# Patient Record
Sex: FEMALE | Race: WHITE | NOT HISPANIC OR LATINO | Employment: OTHER | URBAN - METROPOLITAN AREA
[De-identification: names, ages, dates, MRNs, and addresses within clinical notes are randomized per-mention and may not be internally consistent; named-entity substitution may affect disease eponyms.]

---

## 2017-01-20 ENCOUNTER — HISTORICAL (OUTPATIENT)
Dept: INFUSION THERAPY | Facility: HOSPITAL | Age: 57
End: 2017-01-20

## 2017-02-10 ENCOUNTER — HISTORICAL (OUTPATIENT)
Dept: INFUSION THERAPY | Facility: HOSPITAL | Age: 57
End: 2017-02-10

## 2017-02-17 ENCOUNTER — HISTORICAL (OUTPATIENT)
Dept: INFUSION THERAPY | Facility: HOSPITAL | Age: 57
End: 2017-02-17

## 2017-03-03 ENCOUNTER — HISTORICAL (OUTPATIENT)
Dept: INFUSION THERAPY | Facility: HOSPITAL | Age: 57
End: 2017-03-03

## 2017-03-24 ENCOUNTER — HISTORICAL (OUTPATIENT)
Dept: INFUSION THERAPY | Facility: HOSPITAL | Age: 57
End: 2017-03-24

## 2017-04-13 ENCOUNTER — HISTORICAL (OUTPATIENT)
Dept: INFUSION THERAPY | Facility: HOSPITAL | Age: 57
End: 2017-04-13

## 2017-05-05 ENCOUNTER — HISTORICAL (OUTPATIENT)
Dept: INFUSION THERAPY | Facility: HOSPITAL | Age: 57
End: 2017-05-05

## 2017-05-26 ENCOUNTER — HISTORICAL (OUTPATIENT)
Dept: INFUSION THERAPY | Facility: HOSPITAL | Age: 57
End: 2017-05-26

## 2017-05-26 LAB
ABS NEUT (OLG): 5.01 X10(3)/MCL (ref 2.1–9.2)
ANION GAP SERPL CALC-SCNC: 19 MMOL/L
BASOPHILS # BLD AUTO: 0 X10(3)/MCL (ref 0–0.2)
BASOPHILS NFR BLD AUTO: 0.3 %
BUN SERPL-MCNC: 14 MG/DL (ref 7–18)
CHLORIDE SERPL-SCNC: 99 MMOL/L (ref 98–109)
CREAT SERPL-MCNC: 0.9 MG/DL (ref 0.6–1.3)
EOSINOPHIL # BLD AUTO: 0.3 X10(3)/MCL (ref 0–0.9)
EOSINOPHIL NFR BLD AUTO: 4.7 %
ERYTHROCYTE [DISTWIDTH] IN BLOOD BY AUTOMATED COUNT: 14 % (ref 11.5–17)
GLUCOSE SERPL-MCNC: 193 MG/DL (ref 70–105)
HCT VFR BLD AUTO: 36.7 % (ref 37–47)
HCT VFR BLD CALC: 37 % (ref 38–51)
HGB BLD-MCNC: 11.6 GM/DL (ref 12–16)
HGB BLD-MCNC: 12.6 MG/DL (ref 12–17)
LYMPHOCYTES # BLD AUTO: 1 X10(3)/MCL (ref 0.6–4.6)
LYMPHOCYTES NFR BLD AUTO: 15.3 %
MCH RBC QN AUTO: 29.9 PG (ref 27–31)
MCHC RBC AUTO-ENTMCNC: 31.6 GM/DL (ref 33–36)
MCV RBC AUTO: 94.6 FL (ref 80–94)
MONOCYTES # BLD AUTO: 0.5 X10(3)/MCL (ref 0.1–1.3)
MONOCYTES NFR BLD AUTO: 6.8 %
NEUTROPHILS # BLD AUTO: 5 X10(3)/MCL (ref 2.1–9.2)
NEUTROPHILS NFR BLD AUTO: 72.9 %
PLATELET # BLD AUTO: 125 X10(3)/MCL (ref 130–400)
PMV BLD AUTO: 9.2 FL (ref 9.4–12.4)
POC IONIZED CALCIUM: 1.09 MMOL/L (ref 1.12–1.32)
POC TCO2: 28 MMOL/L (ref 22–27)
POTASSIUM BLD-SCNC: 4.7 MMOL/L (ref 3.5–4.9)
RBC # BLD AUTO: 3.88 X10(6)/MCL (ref 4.2–5.4)
SODIUM BLD-SCNC: 141 MMOL/L (ref 138–146)
WBC # SPEC AUTO: 6.9 X10(3)/MCL (ref 4.5–11.5)

## 2017-06-16 ENCOUNTER — HISTORICAL (OUTPATIENT)
Dept: INFUSION THERAPY | Facility: HOSPITAL | Age: 57
End: 2017-06-16

## 2017-06-16 LAB
ABS NEUT (OLG): 4.61 X10(3)/MCL (ref 2.1–9.2)
ANION GAP SERPL CALC-SCNC: 21 MMOL/L
BASOPHILS # BLD AUTO: 0 X10(3)/MCL (ref 0–0.2)
BASOPHILS NFR BLD AUTO: 0.4 %
BUN SERPL-MCNC: 17 MG/DL (ref 7–18)
CHLORIDE SERPL-SCNC: 96 MMOL/L (ref 98–109)
CREAT SERPL-MCNC: 1 MG/DL (ref 0.6–1.3)
EOSINOPHIL # BLD AUTO: 0.4 X10(3)/MCL (ref 0–0.9)
EOSINOPHIL NFR BLD AUTO: 6.3 %
ERYTHROCYTE [DISTWIDTH] IN BLOOD BY AUTOMATED COUNT: 14 % (ref 11.5–17)
GLUCOSE SERPL-MCNC: 249 MG/DL (ref 70–105)
HCT VFR BLD AUTO: 36.8 % (ref 37–47)
HCT VFR BLD CALC: 37 % (ref 38–51)
HGB BLD-MCNC: 11.6 GM/DL (ref 12–16)
HGB BLD-MCNC: 12.6 MG/DL (ref 12–17)
LYMPHOCYTES # BLD AUTO: 1.3 X10(3)/MCL (ref 0.6–4.6)
LYMPHOCYTES NFR BLD AUTO: 19.2 %
MCH RBC QN AUTO: 29.7 PG (ref 27–31)
MCHC RBC AUTO-ENTMCNC: 31.5 GM/DL (ref 33–36)
MCV RBC AUTO: 94.4 FL (ref 80–94)
MONOCYTES # BLD AUTO: 0.6 X10(3)/MCL (ref 0.1–1.3)
MONOCYTES NFR BLD AUTO: 8.2 %
NEUTROPHILS # BLD AUTO: 4.6 X10(3)/MCL (ref 2.1–9.2)
NEUTROPHILS NFR BLD AUTO: 65.9 %
PLATELET # BLD AUTO: 154 X10(3)/MCL (ref 130–400)
PMV BLD AUTO: 8.9 FL (ref 9.4–12.4)
POC IONIZED CALCIUM: 1.14 MMOL/L (ref 1.12–1.32)
POC TCO2: 28 MMOL/L (ref 22–27)
POTASSIUM BLD-SCNC: 4.8 MMOL/L (ref 3.5–4.9)
RBC # BLD AUTO: 3.9 X10(6)/MCL (ref 4.2–5.4)
SODIUM BLD-SCNC: 138 MMOL/L (ref 138–146)
WBC # SPEC AUTO: 7 X10(3)/MCL (ref 4.5–11.5)

## 2017-07-07 ENCOUNTER — HISTORICAL (OUTPATIENT)
Dept: INFUSION THERAPY | Facility: HOSPITAL | Age: 57
End: 2017-07-07

## 2017-07-07 LAB
ABS NEUT (OLG): 4.24 X10(3)/MCL (ref 2.1–9.2)
ANION GAP SERPL CALC-SCNC: 18 MMOL/L
BASOPHILS # BLD AUTO: 0 X10(3)/MCL (ref 0–0.2)
BASOPHILS NFR BLD AUTO: 0.6 %
BUN SERPL-MCNC: 12 MG/DL (ref 7–18)
CHLORIDE SERPL-SCNC: 95 MMOL/L (ref 98–109)
CREAT SERPL-MCNC: 1 MG/DL (ref 0.6–1.3)
EOSINOPHIL # BLD AUTO: 0.4 X10(3)/MCL (ref 0–0.9)
EOSINOPHIL NFR BLD AUTO: 7.2 %
ERYTHROCYTE [DISTWIDTH] IN BLOOD BY AUTOMATED COUNT: 13.8 % (ref 11.5–17)
GLUCOSE SERPL-MCNC: 376 MG/DL (ref 70–105)
HCT VFR BLD AUTO: 39.3 % (ref 37–47)
HCT VFR BLD CALC: 40 % (ref 38–51)
HGB BLD-MCNC: 12.8 GM/DL (ref 12–16)
HGB BLD-MCNC: 13.6 MG/DL (ref 12–17)
LYMPHOCYTES # BLD AUTO: 1.1 X10(3)/MCL (ref 0.6–4.6)
LYMPHOCYTES NFR BLD AUTO: 17.4 %
MCH RBC QN AUTO: 30 PG (ref 27–31)
MCHC RBC AUTO-ENTMCNC: 32.6 GM/DL (ref 33–36)
MCV RBC AUTO: 92.3 FL (ref 80–94)
MONOCYTES # BLD AUTO: 0.4 X10(3)/MCL (ref 0.1–1.3)
MONOCYTES NFR BLD AUTO: 6.4 %
NEUTROPHILS # BLD AUTO: 4.2 X10(3)/MCL (ref 2.1–9.2)
NEUTROPHILS NFR BLD AUTO: 68.4 %
PLATELET # BLD AUTO: 141 X10(3)/MCL (ref 130–400)
PMV BLD AUTO: 9.8 FL (ref 9.4–12.4)
POC IONIZED CALCIUM: 1.08 MMOL/L (ref 1.12–1.32)
POC TCO2: 30 MMOL/L (ref 22–27)
POTASSIUM BLD-SCNC: 4.8 MMOL/L (ref 3.5–4.9)
RBC # BLD AUTO: 4.26 X10(6)/MCL (ref 4.2–5.4)
SODIUM BLD-SCNC: 136 MMOL/L (ref 138–146)
WBC # SPEC AUTO: 6.2 X10(3)/MCL (ref 4.5–11.5)

## 2017-07-28 ENCOUNTER — HISTORICAL (OUTPATIENT)
Dept: INFUSION THERAPY | Facility: HOSPITAL | Age: 57
End: 2017-07-28

## 2017-07-28 LAB
ABS NEUT (OLG): 4.12 X10(3)/MCL (ref 2.1–9.2)
ALBUMIN SERPL-MCNC: 4 GM/DL (ref 3.4–5)
ALBUMIN/GLOB SERPL: 1.2 {RATIO}
ALP SERPL-CCNC: 80 UNIT/L (ref 38–126)
ALT SERPL-CCNC: 35 UNIT/L (ref 12–78)
AST SERPL-CCNC: 28 UNIT/L (ref 15–37)
BASOPHILS # BLD AUTO: 0 X10(3)/MCL (ref 0–0.2)
BASOPHILS NFR BLD AUTO: 0.5 %
BILIRUB SERPL-MCNC: 0.6 MG/DL (ref 0.2–1)
BILIRUBIN DIRECT+TOT PNL SERPL-MCNC: 0.2 MG/DL (ref 0–0.2)
BILIRUBIN DIRECT+TOT PNL SERPL-MCNC: 0.4 MG/DL (ref 0–0.8)
BUN SERPL-MCNC: 14 MG/DL (ref 7–18)
CALCIUM SERPL-MCNC: 9 MG/DL (ref 8.5–10.1)
CEA SERPL-MCNC: 6 NG/ML (ref 0–3)
CHLORIDE SERPL-SCNC: 94 MMOL/L (ref 98–107)
CO2 SERPL-SCNC: 33 MMOL/L (ref 21–32)
CREAT SERPL-MCNC: 1.2 MG/DL (ref 0.55–1.02)
DEPRECATED CALCIDIOL+CALCIFEROL SERPL-MC: 38.75 NG/ML (ref 30–80)
EOSINOPHIL # BLD AUTO: 0.4 X10(3)/MCL (ref 0–0.9)
EOSINOPHIL NFR BLD AUTO: 6 %
ERYTHROCYTE [DISTWIDTH] IN BLOOD BY AUTOMATED COUNT: 14.1 % (ref 11.5–17)
GLOBULIN SER-MCNC: 3.3 GM/DL (ref 2.4–3.5)
GLUCOSE SERPL-MCNC: 401 MG/DL (ref 74–106)
HCT VFR BLD AUTO: 39.9 % (ref 37–47)
HGB BLD-MCNC: 12.9 GM/DL (ref 12–16)
LYMPHOCYTES # BLD AUTO: 1.2 X10(3)/MCL (ref 0.6–4.6)
LYMPHOCYTES NFR BLD AUTO: 18.8 %
MCH RBC QN AUTO: 29.8 PG (ref 27–31)
MCHC RBC AUTO-ENTMCNC: 32.3 GM/DL (ref 33–36)
MCV RBC AUTO: 92.1 FL (ref 80–94)
MONOCYTES # BLD AUTO: 0.5 X10(3)/MCL (ref 0.1–1.3)
MONOCYTES NFR BLD AUTO: 7.5 %
NEUTROPHILS # BLD AUTO: 4.1 X10(3)/MCL (ref 2.1–9.2)
NEUTROPHILS NFR BLD AUTO: 67.2 %
PLATELET # BLD AUTO: 123 X10(3)/MCL (ref 130–400)
PMV BLD AUTO: 9.6 FL (ref 9.4–12.4)
POTASSIUM SERPL-SCNC: 5.1 MMOL/L (ref 3.5–5.1)
PROT SERPL-MCNC: 7.3 GM/DL (ref 6.4–8.2)
RBC # BLD AUTO: 4.33 X10(6)/MCL (ref 4.2–5.4)
SODIUM SERPL-SCNC: 135 MMOL/L (ref 136–145)
WBC # SPEC AUTO: 6.1 X10(3)/MCL (ref 4.5–11.5)

## 2017-08-02 ENCOUNTER — HISTORICAL (OUTPATIENT)
Dept: HEMATOLOGY/ONCOLOGY | Facility: CLINIC | Age: 57
End: 2017-08-02

## 2017-08-02 LAB
ABS NEUT (OLG): 4.56 X10(3)/MCL (ref 2.1–9.2)
BASOPHILS # BLD AUTO: 0 X10(3)/MCL (ref 0–0.2)
BASOPHILS NFR BLD AUTO: 0.6 %
EOSINOPHIL # BLD AUTO: 0.4 X10(3)/MCL (ref 0–0.9)
EOSINOPHIL NFR BLD AUTO: 6.3 %
ERYTHROCYTE [DISTWIDTH] IN BLOOD BY AUTOMATED COUNT: 14 % (ref 11.5–17)
HCT VFR BLD AUTO: 39.5 % (ref 37–47)
HGB BLD-MCNC: 12.8 GM/DL (ref 12–16)
LYMPHOCYTES # BLD AUTO: 1.3 X10(3)/MCL (ref 0.6–4.6)
LYMPHOCYTES NFR BLD AUTO: 18.6 %
MCH RBC QN AUTO: 29.8 PG (ref 27–31)
MCHC RBC AUTO-ENTMCNC: 32.4 GM/DL (ref 33–36)
MCV RBC AUTO: 92.1 FL (ref 80–94)
MONOCYTES # BLD AUTO: 0.5 X10(3)/MCL (ref 0.1–1.3)
MONOCYTES NFR BLD AUTO: 7.2 %
NEUTROPHILS # BLD AUTO: 4.6 X10(3)/MCL (ref 2.1–9.2)
NEUTROPHILS NFR BLD AUTO: 67.3 %
PLATELET # BLD AUTO: 124 X10(3)/MCL (ref 130–400)
PMV BLD AUTO: 9.8 FL (ref 9.4–12.4)
RBC # BLD AUTO: 4.29 X10(6)/MCL (ref 4.2–5.4)
WBC # SPEC AUTO: 6.8 X10(3)/MCL (ref 4.5–11.5)

## 2017-09-22 ENCOUNTER — HISTORICAL (OUTPATIENT)
Dept: INFUSION THERAPY | Facility: HOSPITAL | Age: 57
End: 2017-09-22

## 2017-11-02 ENCOUNTER — HISTORICAL (OUTPATIENT)
Dept: HEMATOLOGY/ONCOLOGY | Facility: CLINIC | Age: 57
End: 2017-11-02

## 2017-11-02 LAB
ABS NEUT (OLG): 3.03 X10(3)/MCL (ref 2.1–9.2)
ALBUMIN SERPL-MCNC: 3.5 GM/DL (ref 3.4–5)
ALBUMIN/GLOB SERPL: 1.1 {RATIO}
ALP SERPL-CCNC: 114 UNIT/L (ref 38–126)
ALT SERPL-CCNC: 24 UNIT/L (ref 12–78)
AST SERPL-CCNC: 16 UNIT/L (ref 15–37)
BASOPHILS # BLD AUTO: 0 X10(3)/MCL (ref 0–0.2)
BASOPHILS NFR BLD AUTO: 0.5 %
BILIRUB SERPL-MCNC: 0.4 MG/DL (ref 0.2–1)
BILIRUBIN DIRECT+TOT PNL SERPL-MCNC: 0.1 MG/DL (ref 0–0.2)
BILIRUBIN DIRECT+TOT PNL SERPL-MCNC: 0.3 MG/DL (ref 0–0.8)
BUN SERPL-MCNC: 17 MG/DL (ref 7–18)
CALCIUM SERPL-MCNC: 9.1 MG/DL (ref 8.5–10.1)
CEA SERPL-MCNC: 3 NG/ML (ref 0–3)
CHLORIDE SERPL-SCNC: 99 MMOL/L (ref 98–107)
CO2 SERPL-SCNC: 30 MMOL/L (ref 21–32)
CREAT SERPL-MCNC: 1.13 MG/DL (ref 0.55–1.02)
EOSINOPHIL # BLD AUTO: 0.5 X10(3)/MCL (ref 0–0.9)
EOSINOPHIL NFR BLD AUTO: 8.9 %
ERYTHROCYTE [DISTWIDTH] IN BLOOD BY AUTOMATED COUNT: 14.2 % (ref 11.5–17)
GLOBULIN SER-MCNC: 3.1 GM/DL (ref 2.4–3.5)
GLUCOSE SERPL-MCNC: 263 MG/DL (ref 74–106)
HCT VFR BLD AUTO: 37.8 % (ref 37–47)
HGB BLD-MCNC: 11.9 GM/DL (ref 12–16)
LYMPHOCYTES # BLD AUTO: 1.8 X10(3)/MCL (ref 0.6–4.6)
LYMPHOCYTES NFR BLD AUTO: 31 %
MCH RBC QN AUTO: 29.8 PG (ref 27–31)
MCHC RBC AUTO-ENTMCNC: 31.5 GM/DL (ref 33–36)
MCV RBC AUTO: 94.5 FL (ref 80–94)
MONOCYTES # BLD AUTO: 0.4 X10(3)/MCL (ref 0.1–1.3)
MONOCYTES NFR BLD AUTO: 7 %
NEUTROPHILS # BLD AUTO: 3 X10(3)/MCL (ref 2.1–9.2)
NEUTROPHILS NFR BLD AUTO: 52.6 %
PLATELET # BLD AUTO: 127 X10(3)/MCL (ref 130–400)
PMV BLD AUTO: 9.2 FL (ref 9.4–12.4)
POTASSIUM SERPL-SCNC: 5.8 MMOL/L (ref 3.5–5.1)
PROT SERPL-MCNC: 6.6 GM/DL (ref 6.4–8.2)
RBC # BLD AUTO: 4 X10(6)/MCL (ref 4.2–5.4)
SODIUM SERPL-SCNC: 136 MMOL/L (ref 136–145)
WBC # SPEC AUTO: 5.8 X10(3)/MCL (ref 4.5–11.5)

## 2017-11-06 ENCOUNTER — HISTORICAL (OUTPATIENT)
Dept: ADMINISTRATIVE | Facility: HOSPITAL | Age: 57
End: 2017-11-06

## 2017-11-06 LAB
ANION GAP SERPL CALC-SCNC: 19 MMOL/L
BUN SERPL-MCNC: 15 MG/DL (ref 7–18)
CHLORIDE SERPL-SCNC: 99 MMOL/L (ref 98–109)
CREAT SERPL-MCNC: 1 MG/DL (ref 0.6–1.3)
GLUCOSE SERPL-MCNC: 291 MG/DL (ref 70–105)
HCT VFR BLD CALC: 36 % (ref 38–51)
HGB BLD-MCNC: 12.2 MG/DL (ref 12–17)
POC IONIZED CALCIUM: 1.05 MMOL/L (ref 1.12–1.32)
POC TCO2: 27 MMOL/L (ref 22–27)
POTASSIUM BLD-SCNC: 5.4 MMOL/L (ref 3.5–4.9)
SODIUM BLD-SCNC: 138 MMOL/L (ref 138–146)

## 2017-11-13 ENCOUNTER — HISTORICAL (OUTPATIENT)
Dept: LAB | Facility: HOSPITAL | Age: 57
End: 2017-11-13

## 2017-11-13 LAB
APTT PPP: 27.5 SECOND(S) (ref 24.8–36.9)
INR PPP: 1.01 (ref 0–1.27)
PROTHROMBIN TIME: 13.6 SECOND(S) (ref 12.2–14.7)

## 2017-11-15 ENCOUNTER — HISTORICAL (OUTPATIENT)
Dept: SURGERY | Facility: HOSPITAL | Age: 57
End: 2017-11-15

## 2017-12-07 ENCOUNTER — HISTORICAL (OUTPATIENT)
Dept: INFUSION THERAPY | Facility: HOSPITAL | Age: 57
End: 2017-12-07

## 2018-01-15 LAB
INFLUENZA A ANTIGEN, POC: POSITIVE
INFLUENZA B ANTIGEN, POC: NEGATIVE
RAPID GROUP A STREP (OHS): POSITIVE

## 2018-02-05 ENCOUNTER — HISTORICAL (OUTPATIENT)
Dept: INFUSION THERAPY | Facility: HOSPITAL | Age: 58
End: 2018-02-05

## 2018-03-05 ENCOUNTER — HISTORICAL (OUTPATIENT)
Dept: HEMATOLOGY/ONCOLOGY | Facility: CLINIC | Age: 58
End: 2018-03-05

## 2018-03-05 LAB
ABS NEUT (OLG): 3.96 X10(3)/MCL (ref 2.1–9.2)
ALBUMIN SERPL-MCNC: 3.4 GM/DL (ref 3.4–5)
ALBUMIN/GLOB SERPL: 1 {RATIO}
ALP SERPL-CCNC: 65 UNIT/L (ref 38–126)
ALT SERPL-CCNC: 27 UNIT/L (ref 12–78)
AST SERPL-CCNC: 25 UNIT/L (ref 15–37)
BASOPHILS # BLD AUTO: 0 X10(3)/MCL (ref 0–0.2)
BASOPHILS NFR BLD AUTO: 0.4 %
BILIRUB SERPL-MCNC: 0.3 MG/DL (ref 0.2–1)
BILIRUBIN DIRECT+TOT PNL SERPL-MCNC: 0.1 MG/DL (ref 0–0.2)
BILIRUBIN DIRECT+TOT PNL SERPL-MCNC: 0.2 MG/DL (ref 0–0.8)
BUN SERPL-MCNC: 13 MG/DL (ref 7–18)
CALCIUM SERPL-MCNC: 8.8 MG/DL (ref 8.5–10.1)
CEA SERPL-MCNC: 3 NG/ML (ref 0–3)
CHLORIDE SERPL-SCNC: 106 MMOL/L (ref 98–107)
CO2 SERPL-SCNC: 26 MMOL/L (ref 21–32)
CREAT SERPL-MCNC: 1.01 MG/DL (ref 0.55–1.02)
EOSINOPHIL # BLD AUTO: 0.7 X10(3)/MCL (ref 0–0.9)
EOSINOPHIL NFR BLD AUTO: 9.6 %
ERYTHROCYTE [DISTWIDTH] IN BLOOD BY AUTOMATED COUNT: 14.5 % (ref 11.5–17)
GLOBULIN SER-MCNC: 3.4 GM/DL (ref 2.4–3.5)
GLUCOSE SERPL-MCNC: 133 MG/DL (ref 74–106)
HCT VFR BLD AUTO: 37.3 % (ref 37–47)
HGB BLD-MCNC: 11.7 GM/DL (ref 12–16)
LYMPHOCYTES # BLD AUTO: 1.6 X10(3)/MCL (ref 0.6–4.6)
LYMPHOCYTES NFR BLD AUTO: 23.7 %
MCH RBC QN AUTO: 28.6 PG (ref 27–31)
MCHC RBC AUTO-ENTMCNC: 31.4 GM/DL (ref 33–36)
MCV RBC AUTO: 91.2 FL (ref 80–94)
MONOCYTES # BLD AUTO: 0.6 X10(3)/MCL (ref 0.1–1.3)
MONOCYTES NFR BLD AUTO: 9.3 %
NEUTROPHILS # BLD AUTO: 4 X10(3)/MCL (ref 2.1–9.2)
NEUTROPHILS NFR BLD AUTO: 57 %
PLATELET # BLD AUTO: 155 X10(3)/MCL (ref 130–400)
PMV BLD AUTO: 9 FL (ref 9.4–12.4)
POTASSIUM SERPL-SCNC: 5.1 MMOL/L (ref 3.5–5.1)
PROT SERPL-MCNC: 6.8 GM/DL (ref 6.4–8.2)
RBC # BLD AUTO: 4.09 X10(6)/MCL (ref 4.2–5.4)
SODIUM SERPL-SCNC: 140 MMOL/L (ref 136–145)
WBC # SPEC AUTO: 7 X10(3)/MCL (ref 4.5–11.5)

## 2018-04-16 ENCOUNTER — HISTORICAL (OUTPATIENT)
Dept: ADMINISTRATIVE | Facility: HOSPITAL | Age: 58
End: 2018-04-16

## 2018-04-16 ENCOUNTER — HISTORICAL (OUTPATIENT)
Dept: LAB | Facility: HOSPITAL | Age: 58
End: 2018-04-16

## 2018-04-16 LAB
ABS NEUT (OLG): 7.3
ALBUMIN SERPL-MCNC: 4.8 GM/DL (ref 3.4–5)
ALBUMIN/GLOB SERPL: 1.55 {RATIO} (ref 1.5–2.5)
ALP SERPL-CCNC: 56 UNIT/L (ref 38–126)
ALT SERPL-CCNC: 22 UNIT/L (ref 7–52)
APPEARANCE, UA: CLEAR
APTT PPP: 26.2 SECOND(S) (ref 24.8–36.9)
AST SERPL-CCNC: 31 UNIT/L (ref 15–37)
BACTERIA #/AREA URNS AUTO: ABNORMAL /HPF
BILIRUB SERPL-MCNC: 0.5 MG/DL (ref 0.2–1)
BILIRUB UR QL STRIP: NEGATIVE MG/DL
BILIRUBIN DIRECT+TOT PNL SERPL-MCNC: 0.1 MG/DL (ref 0–0.5)
BUN SERPL-MCNC: 14 MG/DL (ref 7–18)
CALCIUM SERPL-MCNC: 9.1 MG/DL (ref 8.5–10)
CHLORIDE SERPL-SCNC: 95 MMOL/L (ref 98–107)
CHOLEST SERPL-MCNC: 143 MG/DL (ref 0–200)
CHOLEST/HDLC SERPL: 3.5 {RATIO}
CO2 SERPL-SCNC: 27 MMOL/L (ref 21–32)
COLOR UR: YELLOW
CREAT SERPL-MCNC: 1.08 MG/DL (ref 0.6–1.3)
CREAT UR-MCNC: 100 MG/DL
CREAT/UREA NIT SERPL: 13
ERYTHROCYTE [DISTWIDTH] IN BLOOD BY AUTOMATED COUNT: 14.4 % (ref 11.5–17)
GGT SERPL-CCNC: 49 UNIT/L (ref 5–85)
GLOBULIN SER-MCNC: 3.1 GM/DL (ref 1.2–3)
GLUCOSE (UA): ABNORMAL MG/DL
GLUCOSE SERPL-MCNC: 400 MG/DL (ref 74–106)
HCT VFR BLD AUTO: 41.4 % (ref 37–47)
HDLC SERPL-MCNC: 41 MG/DL (ref 35–60)
HGB BLD-MCNC: 13.3 GM/DL (ref 12–16)
HGB UR QL STRIP: NEGATIVE UNIT/L
INR PPP: 1.07 (ref 0–1.27)
KETONES UR QL STRIP: NEGATIVE MG/DL
LDH SERPL-CCNC: 193 UNIT/L (ref 140–271)
LDLC SERPL CALC-MCNC: 64 MG/DL (ref 0–129)
LEUKOCYTE ESTERASE UR QL STRIP: NEGATIVE UNIT/L
LYMPHOCYTES # BLD AUTO: 1.5 X10(3)/MCL (ref 0.6–3.4)
LYMPHOCYTES NFR BLD AUTO: 15.9 % (ref 13–40)
MCH RBC QN AUTO: 29.2 PG (ref 27–31.2)
MCHC RBC AUTO-ENTMCNC: 32 GM/DL (ref 32–36)
MCV RBC AUTO: 91 FL (ref 80–94)
MICROALBUMIN UR-MCNC: 30 MG/L
MICROALBUMIN/CREAT RATIO PNL UR: <30 MG/GM
MONOCYTES # BLD AUTO: 0.7 X10(3)/MCL (ref 0–1.8)
MONOCYTES NFR BLD AUTO: 7.2 % (ref 0.1–24)
NEUTROPHILS NFR BLD AUTO: 76.9 % (ref 47–80)
NITRITE UR QL STRIP.AUTO: NEGATIVE
PH UR STRIP: 5.5 [PH]
PLATELET # BLD AUTO: 195 X10(3)/MCL (ref 130–400)
PMV BLD AUTO: 9.4 FL
POTASSIUM SERPL-SCNC: 4.6 MMOL/L (ref 3.5–5.1)
PROT SERPL-MCNC: 7.9 GM/DL (ref 6.4–8.2)
PROT UR QL STRIP: NEGATIVE MG/DL
PROTHROMBIN TIME: 14.2 SECOND(S) (ref 12.2–14.7)
RBC # BLD AUTO: 4.56 X10(6)/MCL (ref 4.2–5.4)
RBC #/AREA URNS HPF: ABNORMAL /HPF
SODIUM SERPL-SCNC: 136 MMOL/L (ref 136–145)
SP GR UR STRIP: 1.01
SQUAMOUS EPITHELIAL, UA: ABNORMAL /LPF
TRIGL SERPL-MCNC: 241 MG/DL (ref 30–150)
UROBILINOGEN UR STRIP-ACNC: 0.2 MG/DL
VLDLC SERPL CALC-MCNC: 48.2 MG/DL
WBC # SPEC AUTO: 9.5 X10(3)/MCL (ref 4.5–11.5)
WBC #/AREA URNS AUTO: ABNORMAL /[HPF]

## 2018-04-23 ENCOUNTER — HISTORICAL (OUTPATIENT)
Dept: LAB | Facility: HOSPITAL | Age: 58
End: 2018-04-23

## 2018-04-24 LAB — GRAM STN SPEC: NORMAL

## 2018-06-18 ENCOUNTER — HISTORICAL (OUTPATIENT)
Dept: INFUSION THERAPY | Facility: HOSPITAL | Age: 58
End: 2018-06-18

## 2018-07-09 ENCOUNTER — HISTORICAL (OUTPATIENT)
Dept: HEMATOLOGY/ONCOLOGY | Facility: CLINIC | Age: 58
End: 2018-07-09

## 2018-07-09 LAB
ABS NEUT (OLG): 4.46 X10(3)/MCL (ref 2.1–9.2)
ALBUMIN SERPL-MCNC: 3.8 GM/DL (ref 3.4–5)
ALBUMIN/GLOB SERPL: 1.1 RATIO (ref 1.1–2)
ALP SERPL-CCNC: 73 UNIT/L (ref 38–126)
ALT SERPL-CCNC: 27 UNIT/L (ref 12–78)
AST SERPL-CCNC: 20 UNIT/L (ref 15–37)
BASOPHILS # BLD AUTO: 0.1 X10(3)/MCL (ref 0–0.2)
BASOPHILS NFR BLD AUTO: 0.8 %
BILIRUB SERPL-MCNC: 0.9 MG/DL (ref 0.2–1)
BILIRUBIN DIRECT+TOT PNL SERPL-MCNC: 0.2 MG/DL (ref 0–0.5)
BILIRUBIN DIRECT+TOT PNL SERPL-MCNC: 0.7 MG/DL (ref 0–0.8)
BUN SERPL-MCNC: 15 MG/DL (ref 7–18)
CALCIUM SERPL-MCNC: 8.7 MG/DL (ref 8.5–10.1)
CEA SERPL-MCNC: 4.6 NG/ML (ref 0–3)
CHLORIDE SERPL-SCNC: 97 MMOL/L (ref 98–107)
CO2 SERPL-SCNC: 29 MMOL/L (ref 21–32)
CREAT SERPL-MCNC: 1.38 MG/DL (ref 0.55–1.02)
EOSINOPHIL # BLD AUTO: 0.7 X10(3)/MCL (ref 0–0.9)
EOSINOPHIL NFR BLD AUTO: 9.8 %
ERYTHROCYTE [DISTWIDTH] IN BLOOD BY AUTOMATED COUNT: 14.9 % (ref 11.5–17)
GLOBULIN SER-MCNC: 3.6 GM/DL (ref 2.4–3.5)
GLUCOSE SERPL-MCNC: 471 MG/DL (ref 74–106)
HCT VFR BLD AUTO: 37 % (ref 37–47)
HGB BLD-MCNC: 11.9 GM/DL (ref 12–16)
LYMPHOCYTES # BLD AUTO: 1.6 X10(3)/MCL (ref 0.6–4.6)
LYMPHOCYTES NFR BLD AUTO: 22.1 %
MCH RBC QN AUTO: 28.3 PG (ref 27–31)
MCHC RBC AUTO-ENTMCNC: 32.2 GM/DL (ref 33–36)
MCV RBC AUTO: 87.9 FL (ref 80–94)
MONOCYTES # BLD AUTO: 0.5 X10(3)/MCL (ref 0.1–1.3)
MONOCYTES NFR BLD AUTO: 6.3 %
NEUTROPHILS # BLD AUTO: 4.5 X10(3)/MCL (ref 2.1–9.2)
NEUTROPHILS NFR BLD AUTO: 61 %
PLATELET # BLD AUTO: 162 X10(3)/MCL (ref 130–400)
PMV BLD AUTO: 9.4 FL (ref 9.4–12.4)
POTASSIUM SERPL-SCNC: 4.9 MMOL/L (ref 3.5–5.1)
PROT SERPL-MCNC: 7.4 GM/DL (ref 6.4–8.2)
RBC # BLD AUTO: 4.21 X10(6)/MCL (ref 4.2–5.4)
SODIUM SERPL-SCNC: 135 MMOL/L (ref 136–145)
WBC # SPEC AUTO: 7.3 X10(3)/MCL (ref 4.5–11.5)

## 2018-09-06 ENCOUNTER — HISTORICAL (OUTPATIENT)
Dept: ADMINISTRATIVE | Facility: HOSPITAL | Age: 58
End: 2018-09-06

## 2018-09-06 LAB
ABS NEUT (OLG): 4.1
ERYTHROCYTE [DISTWIDTH] IN BLOOD BY AUTOMATED COUNT: 14.5 % (ref 11.5–17)
HCT VFR BLD AUTO: 37.5 % (ref 37–47)
HGB BLD-MCNC: 11.9 GM/DL (ref 12–16)
LYMPHOCYTES # BLD AUTO: 1.5 X10(3)/MCL (ref 0.6–3.4)
LYMPHOCYTES NFR BLD AUTO: 22.4 % (ref 13–40)
MCH RBC QN AUTO: 28.5 PG (ref 27–31.2)
MCHC RBC AUTO-ENTMCNC: 32 GM/DL (ref 32–36)
MCV RBC AUTO: 90 FL (ref 80–94)
MONOCYTES # BLD AUTO: 0.9 X10(3)/MCL (ref 0–1.8)
MONOCYTES NFR BLD AUTO: 13.5 % (ref 0.1–24)
NEUTROPHILS NFR BLD AUTO: 64.1 % (ref 47–80)
PLATELET # BLD AUTO: 203 X10(3)/MCL (ref 130–400)
PMV BLD AUTO: 8.9 FL
RBC # BLD AUTO: 4.18 X10(6)/MCL (ref 4.2–5.4)
WBC # SPEC AUTO: 6.5 X10(3)/MCL (ref 4.5–11.5)

## 2018-12-20 ENCOUNTER — HISTORICAL (OUTPATIENT)
Dept: INFUSION THERAPY | Facility: HOSPITAL | Age: 58
End: 2018-12-20

## 2019-01-11 ENCOUNTER — HISTORICAL (OUTPATIENT)
Dept: HEMATOLOGY/ONCOLOGY | Facility: CLINIC | Age: 59
End: 2019-01-11

## 2019-01-11 LAB
ABS NEUT (OLG): 4.96 X10(3)/MCL (ref 2.1–9.2)
ALBUMIN SERPL-MCNC: 3.8 GM/DL (ref 3.4–5)
ALBUMIN/GLOB SERPL: 1.1 RATIO (ref 1.1–2)
ALP SERPL-CCNC: 65 UNIT/L (ref 38–126)
ALT SERPL-CCNC: 25 UNIT/L (ref 12–78)
AST SERPL-CCNC: 30 UNIT/L (ref 15–37)
BASOPHILS # BLD AUTO: 0 X10(3)/MCL (ref 0–0.2)
BASOPHILS NFR BLD AUTO: 0.4 %
BILIRUB SERPL-MCNC: 0.6 MG/DL (ref 0.2–1)
BILIRUBIN DIRECT+TOT PNL SERPL-MCNC: 0.2 MG/DL (ref 0–0.5)
BILIRUBIN DIRECT+TOT PNL SERPL-MCNC: 0.4 MG/DL (ref 0–0.8)
BUN SERPL-MCNC: 28 MG/DL (ref 7–18)
CALCIUM SERPL-MCNC: 8.7 MG/DL (ref 8.5–10.1)
CEA SERPL-MCNC: 3.9 NG/ML (ref 0–3)
CHLORIDE SERPL-SCNC: 101 MMOL/L (ref 98–107)
CO2 SERPL-SCNC: 27 MMOL/L (ref 21–32)
CREAT SERPL-MCNC: 1.47 MG/DL (ref 0.55–1.02)
EOSINOPHIL # BLD AUTO: 0.4 X10(3)/MCL (ref 0–0.9)
EOSINOPHIL NFR BLD AUTO: 5.7 %
ERYTHROCYTE [DISTWIDTH] IN BLOOD BY AUTOMATED COUNT: 14.7 % (ref 11.5–17)
GLOBULIN SER-MCNC: 3.6 GM/DL (ref 2.4–3.5)
GLUCOSE SERPL-MCNC: 225 MG/DL (ref 74–106)
HCT VFR BLD AUTO: 39.7 % (ref 37–47)
HGB BLD-MCNC: 12.1 GM/DL (ref 12–16)
LYMPHOCYTES # BLD AUTO: 1.7 X10(3)/MCL (ref 0.6–4.6)
LYMPHOCYTES NFR BLD AUTO: 21.7 %
MCH RBC QN AUTO: 27.6 PG (ref 27–31)
MCHC RBC AUTO-ENTMCNC: 30.5 GM/DL (ref 33–36)
MCV RBC AUTO: 90.6 FL (ref 80–94)
MONOCYTES # BLD AUTO: 0.6 X10(3)/MCL (ref 0.1–1.3)
MONOCYTES NFR BLD AUTO: 7.4 %
NEUTROPHILS # BLD AUTO: 5 X10(3)/MCL (ref 2.1–9.2)
NEUTROPHILS NFR BLD AUTO: 64.5 %
PLATELET # BLD AUTO: 185 X10(3)/MCL (ref 130–400)
PMV BLD AUTO: 9.2 FL (ref 9.4–12.4)
POTASSIUM SERPL-SCNC: 4.8 MMOL/L (ref 3.5–5.1)
PROT SERPL-MCNC: 7.4 GM/DL (ref 6.4–8.2)
RBC # BLD AUTO: 4.38 X10(6)/MCL (ref 4.2–5.4)
SODIUM SERPL-SCNC: 136 MMOL/L (ref 136–145)
WBC # SPEC AUTO: 7.7 X10(3)/MCL (ref 4.5–11.5)

## 2019-03-13 LAB
INFLUENZA A ANTIGEN, POC: NEGATIVE
INFLUENZA B ANTIGEN, POC: NEGATIVE
RAPID GROUP A STREP (OHS): NEGATIVE

## 2019-05-03 ENCOUNTER — HISTORICAL (OUTPATIENT)
Dept: ANESTHESIOLOGY | Facility: HOSPITAL | Age: 59
End: 2019-05-03

## 2019-06-24 ENCOUNTER — HISTORICAL (OUTPATIENT)
Dept: INFUSION THERAPY | Facility: HOSPITAL | Age: 59
End: 2019-06-24

## 2019-07-12 ENCOUNTER — HISTORICAL (OUTPATIENT)
Dept: HEMATOLOGY/ONCOLOGY | Facility: CLINIC | Age: 59
End: 2019-07-12

## 2019-07-12 LAB
ABS NEUT (OLG): 5.29 X10(3)/MCL (ref 2.1–9.2)
ALBUMIN SERPL-MCNC: 4 GM/DL (ref 3.4–5)
ALBUMIN/GLOB SERPL: 1.1 {RATIO}
ALP SERPL-CCNC: 72 UNIT/L (ref 38–126)
ALT SERPL-CCNC: 22 UNIT/L (ref 12–78)
AST SERPL-CCNC: 21 UNIT/L (ref 15–37)
BASOPHILS # BLD AUTO: 0 X10(3)/MCL (ref 0–0.2)
BASOPHILS NFR BLD AUTO: 0.3 %
BILIRUB SERPL-MCNC: 0.6 MG/DL (ref 0.2–1)
BILIRUBIN DIRECT+TOT PNL SERPL-MCNC: 0.1 MG/DL (ref 0–0.2)
BILIRUBIN DIRECT+TOT PNL SERPL-MCNC: 0.5 MG/DL (ref 0–0.8)
BUN SERPL-MCNC: 17 MG/DL (ref 7–18)
CALCIUM SERPL-MCNC: 8.5 MG/DL (ref 8.5–10.1)
CEA SERPL-MCNC: 2.5 NG/ML (ref 0–3)
CHLORIDE SERPL-SCNC: 104 MMOL/L (ref 98–107)
CO2 SERPL-SCNC: 27 MMOL/L (ref 21–32)
CREAT SERPL-MCNC: 1.19 MG/DL (ref 0.55–1.02)
EOSINOPHIL # BLD AUTO: 0.4 X10(3)/MCL (ref 0–0.9)
EOSINOPHIL NFR BLD AUTO: 4.5 %
ERYTHROCYTE [DISTWIDTH] IN BLOOD BY AUTOMATED COUNT: 15.4 % (ref 11.5–17)
GLOBULIN SER-MCNC: 3.6 GM/DL (ref 2.4–3.5)
GLUCOSE SERPL-MCNC: 77 MG/DL (ref 74–106)
HCT VFR BLD AUTO: 40.5 % (ref 37–47)
HGB BLD-MCNC: 12.2 GM/DL (ref 12–16)
LYMPHOCYTES # BLD AUTO: 1.6 X10(3)/MCL (ref 0.6–4.6)
LYMPHOCYTES NFR BLD AUTO: 20.6 %
MCH RBC QN AUTO: 25.7 PG (ref 27–31)
MCHC RBC AUTO-ENTMCNC: 30.1 GM/DL (ref 33–36)
MCV RBC AUTO: 85.4 FL (ref 80–94)
MONOCYTES # BLD AUTO: 0.6 X10(3)/MCL (ref 0.1–1.3)
MONOCYTES NFR BLD AUTO: 7.7 %
NEUTROPHILS # BLD AUTO: 5.3 X10(3)/MCL (ref 2.1–9.2)
NEUTROPHILS NFR BLD AUTO: 66.6 %
PLATELET # BLD AUTO: 201 X10(3)/MCL (ref 130–400)
PMV BLD AUTO: 8.8 FL (ref 9.4–12.4)
POTASSIUM SERPL-SCNC: 4.8 MMOL/L (ref 3.5–5.1)
PROT SERPL-MCNC: 7.6 GM/DL (ref 6.4–8.2)
RBC # BLD AUTO: 4.74 X10(6)/MCL (ref 4.2–5.4)
SODIUM SERPL-SCNC: 139 MMOL/L (ref 136–145)
WBC # SPEC AUTO: 7.9 X10(3)/MCL (ref 4.5–11.5)

## 2020-01-13 ENCOUNTER — HISTORICAL (OUTPATIENT)
Dept: INFUSION THERAPY | Facility: HOSPITAL | Age: 60
End: 2020-01-13

## 2020-01-13 LAB
ABS NEUT (OLG): 7.62 X10(3)/MCL (ref 2.1–9.2)
ALBUMIN SERPL-MCNC: 3.9 GM/DL (ref 3.4–5)
ALBUMIN/GLOB SERPL: 1.2 {RATIO}
ALP SERPL-CCNC: 70 UNIT/L (ref 38–126)
ALT SERPL-CCNC: 24 UNIT/L (ref 12–78)
AST SERPL-CCNC: 15 UNIT/L (ref 15–37)
BASOPHILS # BLD AUTO: 0 X10(3)/MCL (ref 0–0.2)
BASOPHILS NFR BLD AUTO: 0.3 %
BILIRUB SERPL-MCNC: 0.7 MG/DL (ref 0.2–1)
BILIRUBIN DIRECT+TOT PNL SERPL-MCNC: 0.1 MG/DL (ref 0–0.2)
BILIRUBIN DIRECT+TOT PNL SERPL-MCNC: 0.6 MG/DL (ref 0–0.8)
BUN SERPL-MCNC: 31 MG/DL (ref 7–18)
CALCIUM SERPL-MCNC: 9.8 MG/DL (ref 8.5–10.1)
CEA SERPL-MCNC: 4.1 NG/ML (ref 0–3)
CHLORIDE SERPL-SCNC: 104 MMOL/L (ref 98–107)
CO2 SERPL-SCNC: 28 MMOL/L (ref 21–32)
CREAT SERPL-MCNC: 1.19 MG/DL (ref 0.55–1.02)
EOSINOPHIL # BLD AUTO: 0.4 X10(3)/MCL (ref 0–0.9)
EOSINOPHIL NFR BLD AUTO: 4.1 %
ERYTHROCYTE [DISTWIDTH] IN BLOOD BY AUTOMATED COUNT: 14.4 % (ref 11.5–17)
GLOBULIN SER-MCNC: 3.3 GM/DL (ref 2.4–3.5)
GLUCOSE SERPL-MCNC: 81 MG/DL (ref 74–106)
HCT VFR BLD AUTO: 38.4 % (ref 37–47)
HGB BLD-MCNC: 11.4 GM/DL (ref 12–16)
LYMPHOCYTES # BLD AUTO: 1.7 X10(3)/MCL (ref 0.6–4.6)
LYMPHOCYTES NFR BLD AUTO: 16.7 %
MCH RBC QN AUTO: 25.5 PG (ref 27–31)
MCHC RBC AUTO-ENTMCNC: 29.7 GM/DL (ref 33–36)
MCV RBC AUTO: 85.9 FL (ref 80–94)
MONOCYTES # BLD AUTO: 0.5 X10(3)/MCL (ref 0.1–1.3)
MONOCYTES NFR BLD AUTO: 5.2 %
NEUTROPHILS # BLD AUTO: 7.6 X10(3)/MCL (ref 2.1–9.2)
NEUTROPHILS NFR BLD AUTO: 73.5 %
PLATELET # BLD AUTO: 234 X10(3)/MCL (ref 130–400)
PMV BLD AUTO: 9 FL (ref 9.4–12.4)
POTASSIUM SERPL-SCNC: 4.7 MMOL/L (ref 3.5–5.1)
PROT SERPL-MCNC: 7.2 GM/DL (ref 6.4–8.2)
RBC # BLD AUTO: 4.47 X10(6)/MCL (ref 4.2–5.4)
SODIUM SERPL-SCNC: 140 MMOL/L (ref 136–145)
WBC # SPEC AUTO: 10.4 X10(3)/MCL (ref 4.5–11.5)

## 2020-07-13 ENCOUNTER — HISTORICAL (OUTPATIENT)
Dept: HEMATOLOGY/ONCOLOGY | Facility: CLINIC | Age: 60
End: 2020-07-13

## 2020-07-13 LAB
ABS NEUT (OLG): 5.96 X10(3)/MCL (ref 2.1–9.2)
ALBUMIN SERPL-MCNC: 3.9 GM/DL (ref 3.4–5)
ALBUMIN/GLOB SERPL: 1.2 RATIO (ref 1.1–2)
ALP SERPL-CCNC: 54 UNIT/L (ref 40–150)
ALT SERPL-CCNC: 12 UNIT/L (ref 0–55)
AST SERPL-CCNC: 18 UNIT/L (ref 5–34)
BASOPHILS # BLD AUTO: 0 X10(3)/MCL (ref 0–0.2)
BASOPHILS NFR BLD AUTO: 0.5 %
BILIRUB SERPL-MCNC: 0.4 MG/DL
BILIRUBIN DIRECT+TOT PNL SERPL-MCNC: 0.2 MG/DL (ref 0–0.5)
BILIRUBIN DIRECT+TOT PNL SERPL-MCNC: 0.2 MG/DL (ref 0–0.8)
BUN SERPL-MCNC: 21.4 MG/DL (ref 9.8–20.1)
CALCIUM SERPL-MCNC: 9.6 MG/DL (ref 8.4–10.2)
CEA SERPL-MCNC: 2.78 MG/ML (ref 0–3)
CHLORIDE SERPL-SCNC: 101 MMOL/L (ref 98–107)
CO2 SERPL-SCNC: 29 MMOL/L (ref 23–31)
CREAT SERPL-MCNC: 1.33 MG/DL (ref 0.55–1.02)
EOSINOPHIL # BLD AUTO: 0.8 X10(3)/MCL (ref 0–0.9)
EOSINOPHIL NFR BLD AUTO: 8.6 %
ERYTHROCYTE [DISTWIDTH] IN BLOOD BY AUTOMATED COUNT: 20.9 % (ref 11.5–17)
FERRITIN SERPL-MCNC: 9.34 NG/ML (ref 4.63–204)
GLOBULIN SER-MCNC: 3.3 GM/DL (ref 2.4–3.5)
GLUCOSE SERPL-MCNC: 170 MG/DL (ref 82–115)
HCT VFR BLD AUTO: 37.3 % (ref 37–47)
HGB BLD-MCNC: 10.8 GM/DL (ref 12–16)
IRON SATN MFR SERPL: 15 % (ref 20–50)
IRON SERPL-MCNC: 58 UG/DL (ref 50–170)
LYMPHOCYTES # BLD AUTO: 1.9 X10(3)/MCL (ref 0.6–4.6)
LYMPHOCYTES NFR BLD AUTO: 20 %
MCH RBC QN AUTO: 24.3 PG (ref 27–31)
MCHC RBC AUTO-ENTMCNC: 29 GM/DL (ref 33–36)
MCV RBC AUTO: 84 FL (ref 80–94)
MONOCYTES # BLD AUTO: 0.6 X10(3)/MCL (ref 0.1–1.3)
MONOCYTES NFR BLD AUTO: 6.5 %
NEUTROPHILS # BLD AUTO: 6 X10(3)/MCL (ref 2.1–9.2)
NEUTROPHILS NFR BLD AUTO: 64.2 %
PLATELET # BLD AUTO: 192 X10(3)/MCL (ref 130–400)
PMV BLD AUTO: 9.1 FL (ref 9.4–12.4)
POTASSIUM SERPL-SCNC: 5 MMOL/L (ref 3.5–5.1)
PROT SERPL-MCNC: 7.2 GM/DL (ref 5.8–7.6)
RBC # BLD AUTO: 4.44 X10(6)/MCL (ref 4.2–5.4)
SODIUM SERPL-SCNC: 141 MMOL/L (ref 136–145)
TIBC SERPL-MCNC: 324 UG/DL (ref 70–310)
TIBC SERPL-MCNC: 382 UG/DL (ref 250–450)
TRANSFERRIN SERPL-MCNC: 355 MG/DL (ref 180–382)
WBC # SPEC AUTO: 9.3 X10(3)/MCL (ref 4.5–11.5)

## 2020-07-15 ENCOUNTER — HISTORICAL (OUTPATIENT)
Dept: INFUSION THERAPY | Facility: HOSPITAL | Age: 60
End: 2020-07-15

## 2020-07-22 ENCOUNTER — HISTORICAL (OUTPATIENT)
Dept: INFUSION THERAPY | Facility: HOSPITAL | Age: 60
End: 2020-07-22

## 2020-09-14 ENCOUNTER — HISTORICAL (OUTPATIENT)
Dept: ADMINISTRATIVE | Facility: HOSPITAL | Age: 60
End: 2020-09-14

## 2020-09-14 LAB
ABS NEUT (OLG): 5.12 X10(3)/MCL (ref 2.1–9.2)
BASOPHILS # BLD AUTO: 0.1 X10(3)/MCL (ref 0–0.2)
BASOPHILS NFR BLD AUTO: 0.7 %
EOSINOPHIL # BLD AUTO: 0.7 X10(3)/MCL (ref 0–0.9)
EOSINOPHIL NFR BLD AUTO: 7.9 %
ERYTHROCYTE [DISTWIDTH] IN BLOOD BY AUTOMATED COUNT: 19.4 % (ref 11.5–17)
HCT VFR BLD AUTO: 42.3 % (ref 37–47)
HGB BLD-MCNC: 13.5 GM/DL (ref 12–16)
LYMPHOCYTES # BLD AUTO: 2 X10(3)/MCL (ref 0.6–4.6)
LYMPHOCYTES NFR BLD AUTO: 23.3 %
MCH RBC QN AUTO: 28.9 PG (ref 27–31)
MCHC RBC AUTO-ENTMCNC: 31.9 GM/DL (ref 33–36)
MCV RBC AUTO: 90.6 FL (ref 80–94)
MONOCYTES # BLD AUTO: 0.6 X10(3)/MCL (ref 0.1–1.3)
MONOCYTES NFR BLD AUTO: 7.4 %
NEUTROPHILS # BLD AUTO: 5.1 X10(3)/MCL (ref 2.1–9.2)
NEUTROPHILS NFR BLD AUTO: 60.6 %
PLATELET # BLD AUTO: 173 X10(3)/MCL (ref 130–400)
PMV BLD AUTO: 9 FL (ref 9.4–12.4)
RBC # BLD AUTO: 4.67 X10(6)/MCL (ref 4.2–5.4)
WBC # SPEC AUTO: 8.5 X10(3)/MCL (ref 4.5–11.5)

## 2021-01-22 ENCOUNTER — HISTORICAL (OUTPATIENT)
Dept: HEMATOLOGY/ONCOLOGY | Facility: CLINIC | Age: 61
End: 2021-01-22

## 2021-01-22 LAB
ABS NEUT (OLG): 6.73 X10(3)/MCL (ref 2.1–9.2)
ALBUMIN SERPL-MCNC: 4.2 GM/DL (ref 3.4–4.8)
ALBUMIN/GLOB SERPL: 1.4 RATIO (ref 1.1–2)
ALP SERPL-CCNC: 63 UNIT/L (ref 40–150)
ALT SERPL-CCNC: 19 UNIT/L (ref 0–55)
AST SERPL-CCNC: 22 UNIT/L (ref 5–34)
BASOPHILS # BLD AUTO: 0 X10(3)/MCL (ref 0–0.2)
BASOPHILS NFR BLD AUTO: 0.5 %
BILIRUB SERPL-MCNC: 0.5 MG/DL
BILIRUBIN DIRECT+TOT PNL SERPL-MCNC: 0.2 MG/DL (ref 0–0.5)
BILIRUBIN DIRECT+TOT PNL SERPL-MCNC: 0.3 MG/DL (ref 0–0.8)
BUN SERPL-MCNC: 21.4 MG/DL (ref 9.8–20.1)
CALCIUM SERPL-MCNC: 9.5 MG/DL (ref 8.4–10.2)
CEA SERPL-MCNC: 2.75 NG/ML (ref 0–3)
CHLORIDE SERPL-SCNC: 100 MMOL/L (ref 98–107)
CO2 SERPL-SCNC: 32 MMOL/L (ref 23–31)
CREAT SERPL-MCNC: 1.07 MG/DL (ref 0.55–1.02)
EOSINOPHIL # BLD AUTO: 0.8 X10(3)/MCL (ref 0–0.9)
EOSINOPHIL NFR BLD AUTO: 7.5 %
ERYTHROCYTE [DISTWIDTH] IN BLOOD BY AUTOMATED COUNT: 12.6 % (ref 11.5–17)
FERRITIN SERPL-MCNC: 382.51 NG/ML (ref 4.63–204)
GLOBULIN SER-MCNC: 2.9 GM/DL (ref 2.4–3.5)
GLUCOSE SERPL-MCNC: 90 MG/DL (ref 82–115)
HCT VFR BLD AUTO: 40.3 % (ref 37–47)
HGB BLD-MCNC: 12.9 GM/DL (ref 12–16)
IRON SATN MFR SERPL: 19 % (ref 20–50)
IRON SERPL-MCNC: 47 UG/DL (ref 50–170)
LYMPHOCYTES # BLD AUTO: 1.7 X10(3)/MCL (ref 0.6–4.6)
LYMPHOCYTES NFR BLD AUTO: 17.3 %
MCH RBC QN AUTO: 31.5 PG (ref 27–31)
MCHC RBC AUTO-ENTMCNC: 32 GM/DL (ref 33–36)
MCV RBC AUTO: 98.3 FL (ref 80–94)
MONOCYTES # BLD AUTO: 0.8 X10(3)/MCL (ref 0.1–1.3)
MONOCYTES NFR BLD AUTO: 7.6 %
NEUTROPHILS # BLD AUTO: 6.7 X10(3)/MCL (ref 2.1–9.2)
NEUTROPHILS NFR BLD AUTO: 66.9 %
PLATELET # BLD AUTO: 150 X10(3)/MCL (ref 130–400)
PMV BLD AUTO: 8.9 FL (ref 9.4–12.4)
POTASSIUM SERPL-SCNC: 4.6 MMOL/L (ref 3.5–5.1)
PROT SERPL-MCNC: 7.1 GM/DL (ref 5.8–7.6)
RBC # BLD AUTO: 4.1 X10(6)/MCL (ref 4.2–5.4)
SODIUM SERPL-SCNC: 142 MMOL/L (ref 136–145)
TIBC SERPL-MCNC: 202 UG/DL (ref 70–310)
TIBC SERPL-MCNC: 249 UG/DL (ref 250–450)
TRANSFERRIN SERPL-MCNC: 214 MG/DL (ref 173–360)
WBC # SPEC AUTO: 10 X10(3)/MCL (ref 4.5–11.5)

## 2021-02-26 ENCOUNTER — HISTORICAL (OUTPATIENT)
Dept: INFUSION THERAPY | Facility: HOSPITAL | Age: 61
End: 2021-02-26

## 2021-03-15 ENCOUNTER — HISTORICAL (OUTPATIENT)
Dept: ADMINISTRATIVE | Facility: HOSPITAL | Age: 61
End: 2021-03-15

## 2021-03-15 LAB
APPEARANCE, UA: CLEAR
BACTERIA #/AREA URNS AUTO: ABNORMAL /HPF
BILIRUB UR QL STRIP: NEGATIVE MG/DL
CHOLEST SERPL-MCNC: 135 MG/DL (ref 0–200)
CHOLEST/HDLC SERPL: 2.6 {RATIO}
COLOR UR: YELLOW
CREAT UR-MCNC: 200 MG/DL
DEPRECATED CALCIDIOL+CALCIFEROL SERPL-MC: 69.7 NG/ML (ref 30–80)
EST. AVERAGE GLUCOSE BLD GHB EST-MCNC: 146 MG/DL
GLUCOSE (UA): ABNORMAL MG/DL
HBA1C MFR BLD: 6.7 % (ref 4.4–6.4)
HDLC SERPL-MCNC: 51 MG/DL (ref 35–60)
HGB UR QL STRIP: NEGATIVE UNIT/L
KETONES UR QL STRIP: NEGATIVE MG/DL
LDLC SERPL CALC-MCNC: 56 MG/DL (ref 0–129)
LEUKOCYTE ESTERASE UR QL STRIP: NEGATIVE UNIT/L
MICROALBUMIN UR-MCNC: 30 MG/L
MICROALBUMIN/CREAT RATIO PNL UR: <30 MG/GM
NITRITE UR QL STRIP.AUTO: NEGATIVE
PH UR STRIP: 5.5 [PH]
PROT UR QL STRIP: NEGATIVE MG/DL
RBC #/AREA URNS HPF: ABNORMAL /HPF
SP GR UR STRIP: 1.01
SQUAMOUS EPITHELIAL, UA: ABNORMAL /LPF
TRIGL SERPL-MCNC: 213 MG/DL (ref 30–150)
UROBILINOGEN UR STRIP-ACNC: 0.2 MG/DL
VLDLC SERPL CALC-MCNC: 42.6 MG/DL
WBC #/AREA URNS AUTO: ABNORMAL /[HPF]

## 2021-06-15 ENCOUNTER — HISTORICAL (OUTPATIENT)
Dept: ADMINISTRATIVE | Facility: HOSPITAL | Age: 61
End: 2021-06-15

## 2021-06-15 LAB
EST. AVERAGE GLUCOSE BLD GHB EST-MCNC: 137 MG/DL
HBA1C MFR BLD: 6.4 % (ref 4.4–6.4)

## 2021-07-22 ENCOUNTER — HISTORICAL (OUTPATIENT)
Dept: HEMATOLOGY/ONCOLOGY | Facility: CLINIC | Age: 61
End: 2021-07-22

## 2021-07-22 LAB
ABS NEUT (OLG): 4.41 X10(3)/MCL (ref 2.1–9.2)
ALBUMIN SERPL-MCNC: 3.8 GM/DL (ref 3.4–4.8)
ALBUMIN/GLOB SERPL: 1.2 RATIO (ref 1.1–2)
ALP SERPL-CCNC: 47 UNIT/L (ref 40–150)
ALT SERPL-CCNC: 24 UNIT/L (ref 0–55)
AST SERPL-CCNC: 25 UNIT/L (ref 5–34)
BASOPHILS # BLD AUTO: 0 X10(3)/MCL (ref 0–0.2)
BASOPHILS NFR BLD AUTO: 0.7 %
BILIRUB SERPL-MCNC: 0.4 MG/DL
BILIRUBIN DIRECT+TOT PNL SERPL-MCNC: 0.2 MG/DL (ref 0–0.5)
BILIRUBIN DIRECT+TOT PNL SERPL-MCNC: 0.2 MG/DL (ref 0–0.8)
BUN SERPL-MCNC: 10.4 MG/DL (ref 9.8–20.1)
CALCIUM SERPL-MCNC: 9.5 MG/DL (ref 8.4–10.2)
CEA SERPL-MCNC: 2.53 NG/ML (ref 0–3)
CHLORIDE SERPL-SCNC: 103 MMOL/L (ref 98–107)
CO2 SERPL-SCNC: 30 MMOL/L (ref 23–31)
CREAT SERPL-MCNC: 1.09 MG/DL (ref 0.55–1.02)
EOSINOPHIL # BLD AUTO: 0.8 X10(3)/MCL (ref 0–0.9)
EOSINOPHIL NFR BLD AUTO: 11.1 %
ERYTHROCYTE [DISTWIDTH] IN BLOOD BY AUTOMATED COUNT: 13.1 % (ref 11.5–17)
FERRITIN SERPL-MCNC: 278.88 NG/ML (ref 4.63–204)
GLOBULIN SER-MCNC: 3.1 GM/DL (ref 2.4–3.5)
GLUCOSE SERPL-MCNC: 105 MG/DL (ref 82–115)
HCT VFR BLD AUTO: 40.9 % (ref 37–47)
HGB BLD-MCNC: 12.8 GM/DL (ref 12–16)
LYMPHOCYTES # BLD AUTO: 1.6 X10(3)/MCL (ref 0.6–4.6)
LYMPHOCYTES NFR BLD AUTO: 21.6 %
MCH RBC QN AUTO: 30.5 PG (ref 27–31)
MCHC RBC AUTO-ENTMCNC: 31.3 GM/DL (ref 33–36)
MCV RBC AUTO: 97.6 FL (ref 80–94)
MONOCYTES # BLD AUTO: 0.4 X10(3)/MCL (ref 0.1–1.3)
MONOCYTES NFR BLD AUTO: 6.2 %
NEUTROPHILS # BLD AUTO: 4.4 X10(3)/MCL (ref 2.1–9.2)
NEUTROPHILS NFR BLD AUTO: 60.3 %
PLATELET # BLD AUTO: 141 X10(3)/MCL (ref 130–400)
PMV BLD AUTO: 8.8 FL (ref 9.4–12.4)
POTASSIUM SERPL-SCNC: 5.6 MMOL/L (ref 3.5–5.1)
PROT SERPL-MCNC: 6.9 GM/DL (ref 5.8–7.6)
RBC # BLD AUTO: 4.19 X10(6)/MCL (ref 4.2–5.4)
SODIUM SERPL-SCNC: 144 MMOL/L (ref 136–145)
WBC # SPEC AUTO: 7.3 X10(3)/MCL (ref 4.5–11.5)

## 2021-08-27 ENCOUNTER — HISTORICAL (OUTPATIENT)
Dept: INFUSION THERAPY | Facility: HOSPITAL | Age: 61
End: 2021-08-27

## 2021-09-03 ENCOUNTER — HISTORICAL (OUTPATIENT)
Dept: RADIOLOGY | Facility: HOSPITAL | Age: 61
End: 2021-09-03

## 2021-09-15 ENCOUNTER — HISTORICAL (OUTPATIENT)
Dept: ADMINISTRATIVE | Facility: HOSPITAL | Age: 61
End: 2021-09-15

## 2021-09-15 LAB
EST. AVERAGE GLUCOSE BLD GHB EST-MCNC: 134 MG/DL
HBA1C MFR BLD: 6.3 % (ref 4.4–6.4)

## 2021-11-04 LAB
RAPID GROUP A STREP (OHS): POSITIVE
SARS-COV-2 RNA RESP QL NAA+PROBE: NEGATIVE

## 2021-12-07 ENCOUNTER — HISTORICAL (OUTPATIENT)
Dept: ADMINISTRATIVE | Facility: HOSPITAL | Age: 61
End: 2021-12-07

## 2021-12-07 LAB — SARS-COV-2 RNA RESP QL NAA+PROBE: NEGATIVE

## 2021-12-10 ENCOUNTER — HISTORICAL (OUTPATIENT)
Dept: ADMINISTRATIVE | Facility: HOSPITAL | Age: 61
End: 2021-12-10

## 2021-12-10 LAB — SARS-COV-2 RNA RESP QL NAA+PROBE: NEGATIVE

## 2021-12-17 ENCOUNTER — HISTORICAL (OUTPATIENT)
Dept: ADMINISTRATIVE | Facility: HOSPITAL | Age: 61
End: 2021-12-17

## 2021-12-17 LAB
EST. AVERAGE GLUCOSE BLD GHB EST-MCNC: 151 MG/DL
HBA1C MFR BLD: 6.9 % (ref 4.4–6.4)

## 2022-01-05 LAB
RAPID GROUP A STREP (OHS): NEGATIVE
SARS-COV-2 RNA RESP QL NAA+PROBE: NEGATIVE

## 2022-01-24 ENCOUNTER — HISTORICAL (OUTPATIENT)
Dept: HEMATOLOGY/ONCOLOGY | Facility: CLINIC | Age: 62
End: 2022-01-24

## 2022-01-24 LAB
ABS NEUT (OLG): 4.69 X10(3)/MCL (ref 2.1–9.2)
ALBUMIN SERPL-MCNC: 4 GM/DL (ref 3.4–4.8)
ALBUMIN/GLOB SERPL: 1.4 RATIO (ref 1.1–2)
ALP SERPL-CCNC: 52 UNIT/L (ref 40–150)
ALT SERPL-CCNC: 24 UNIT/L (ref 0–55)
AST SERPL-CCNC: 25 UNIT/L (ref 5–34)
BASOPHILS # BLD AUTO: 0.1 X10(3)/MCL (ref 0–0.2)
BASOPHILS NFR BLD AUTO: 0.8 %
BILIRUB SERPL-MCNC: 0.5 MG/DL
BILIRUBIN DIRECT+TOT PNL SERPL-MCNC: 0.2 MG/DL (ref 0–0.5)
BILIRUBIN DIRECT+TOT PNL SERPL-MCNC: 0.3 MG/DL (ref 0–0.8)
BUN SERPL-MCNC: 13.4 MG/DL (ref 9.8–20.1)
CALCIUM SERPL-MCNC: 10 MG/DL (ref 8.7–10.5)
CHLORIDE SERPL-SCNC: 102 MMOL/L (ref 98–107)
CO2 SERPL-SCNC: 26 MMOL/L (ref 23–31)
CREAT SERPL-MCNC: 1.2 MG/DL (ref 0.55–1.02)
EOSINOPHIL # BLD AUTO: 0.6 X10(3)/MCL (ref 0–0.9)
EOSINOPHIL NFR BLD AUTO: 8.2 %
ERYTHROCYTE [DISTWIDTH] IN BLOOD BY AUTOMATED COUNT: 13.3 % (ref 11.5–17)
GLOBULIN SER-MCNC: 2.8 GM/DL (ref 2.4–3.5)
GLUCOSE SERPL-MCNC: 255 MG/DL (ref 82–115)
HCT VFR BLD AUTO: 40.6 % (ref 37–47)
HGB BLD-MCNC: 12.8 GM/DL (ref 12–16)
LYMPHOCYTES # BLD AUTO: 1.8 X10(3)/MCL (ref 0.6–4.6)
LYMPHOCYTES NFR BLD AUTO: 23.2 %
MCH RBC QN AUTO: 31.3 PG (ref 27–31)
MCHC RBC AUTO-ENTMCNC: 31.5 GM/DL (ref 33–36)
MCV RBC AUTO: 99.3 FL (ref 80–94)
MONOCYTES # BLD AUTO: 0.4 X10(3)/MCL (ref 0.1–1.3)
MONOCYTES NFR BLD AUTO: 5.8 %
NEUTROPHILS # BLD AUTO: 4.7 X10(3)/MCL (ref 2.1–9.2)
NEUTROPHILS NFR BLD AUTO: 61.7 %
PLATELET # BLD AUTO: 176 X10(3)/MCL (ref 130–400)
PMV BLD AUTO: 9.3 FL (ref 9.4–12.4)
POTASSIUM SERPL-SCNC: 5 MMOL/L (ref 3.5–5.1)
PROT SERPL-MCNC: 6.8 GM/DL (ref 5.8–7.6)
RBC # BLD AUTO: 4.09 X10(6)/MCL (ref 4.2–5.4)
SODIUM SERPL-SCNC: 142 MMOL/L (ref 136–145)
WBC # SPEC AUTO: 7.6 X10(3)/MCL (ref 4.5–11.5)

## 2022-04-11 ENCOUNTER — HISTORICAL (OUTPATIENT)
Dept: ADMINISTRATIVE | Facility: HOSPITAL | Age: 62
End: 2022-04-11
Payer: COMMERCIAL

## 2022-04-28 VITALS
DIASTOLIC BLOOD PRESSURE: 82 MMHG | BODY MASS INDEX: 29.74 KG/M2 | HEIGHT: 64 IN | OXYGEN SATURATION: 97 % | SYSTOLIC BLOOD PRESSURE: 122 MMHG | WEIGHT: 174.19 LBS

## 2022-04-30 NOTE — H&P
Patient:   Dede Castillo            MRN: [id number]            FIN: [id number]               Age:   57 years     Sex:  Female     :  1960   Associated Diagnoses:   None   Author:   Nadeem GUAJARDO, Tena Valdez      Health Status   The H&P was reviewed, the patient was examined, and there are no changes to the patient's condition..

## 2022-04-30 NOTE — OP NOTE
Neurosurgery      Patient:   Dede Castillo            MRN: [id number]            FIN: [id number]               Age:   57 years     Sex:  Female     :  1960   Associated Diagnoses:   Unspecified fracture of unspecified thoracic vertebra; Bilateral back pain   Author:   José Miguel Feng MD      Operative Note   Operative Information   Date/ Time:  11/15/2017 13:02:00.     Procedures Performed: T12 kyphoplasty using the Sylvie kyphoplasty sytem and cement, using C-arm fluoroscopy..     Indications: dictated.     Preoperative Diagnosis: Unspecified fracture of unspecified thoracic vertebra (CET88-TI S22.009), Bilateral back pain (SUW19-GI M54.9).     Postoperative Diagnosis: Unspecified fracture of unspecified thoracic vertebra (WGA17-CB S22.009), Bilateral back pain (AGO27-ES M54.9).     Surgeon: José Miguel Feng MD.     Assistant: Tena Kat.     Anesthesia: GETA.     Description of Procedure/Findings/    Complications: dictated.     Esimated blood loss: loss less than  50  cc.     Findings: dictated.     Complications: None.

## 2022-04-30 NOTE — OP NOTE
DATE OF SURGERY:        SURGEON:  José Miguel Feng MD    PREOPERATIVE DIAGNOSIS:  T12 compression fracture, severe back pain.    POSTOPERATIVE DIAGNOSIS:  T12 compression fracture, severe back pain.    PROCEDURE PERFORMED:  T12 kyphoplasty using Sylvie cement with Kyphon balloon, [employer].    COMPLICATIONS:  None.    INDICATIONS:  The patient is a 57-year-old with compression fracture for kyphoplasty.  The risks and benefits were discussed, the risks being bleeding, infection, weakness, possibility it does not work, possibility of further surgery, further fracture.  She understands and wants to proceed with surgery.    OPERATIVE PROCEDURE:  Brought to the operating room, turned prone, back was prepped and draped.  Stab incision at the T12 put into the pedicle, into the vertebral body.  Once we had done that the cannula out and put in the balloon, inflated the balloon, and then poured in cement.  There was 2 to 3 on each side and no complications.  She did well.  There were no changes in the monitoring.    ASSISTANT:  BENNETT Cruz        ______________________________  MD CEFERINO Pierre/XANDER  DD:  11/15/2017  Time:  01:39PM  DT:  11/16/2017  Time:  01:58PM  Job #:  [id number]

## 2022-06-30 ENCOUNTER — HOSPITAL ENCOUNTER (OUTPATIENT)
Dept: RADIOLOGY | Facility: HOSPITAL | Age: 62
Discharge: HOME | End: 2022-06-30
Attending: NURSE PRACTITIONER
Payer: COMMERCIAL

## 2022-06-30 DIAGNOSIS — Z12.31 ENCOUNTER FOR SCREENING MAMMOGRAM FOR MALIGNANT NEOPLASM OF BREAST: ICD-10-CM

## 2022-06-30 PROCEDURE — 77063 BREAST TOMOSYNTHESIS BI: CPT | Mod: 26,,, | Performed by: RADIOLOGY

## 2022-06-30 PROCEDURE — 77067 SCR MAMMO BI INCL CAD: CPT | Mod: 26,,, | Performed by: RADIOLOGY

## 2022-06-30 PROCEDURE — 77063 MAMMO DIGITAL SCREENING BILAT WITH TOMO: ICD-10-PCS | Mod: 26,,, | Performed by: RADIOLOGY

## 2022-06-30 PROCEDURE — 77067 SCR MAMMO BI INCL CAD: CPT | Mod: TC

## 2022-06-30 PROCEDURE — 77067 MAMMO DIGITAL SCREENING BILAT WITH TOMO: ICD-10-PCS | Mod: 26,,, | Performed by: RADIOLOGY

## 2022-07-08 ENCOUNTER — TELEPHONE (OUTPATIENT)
Dept: HEMATOLOGY/ONCOLOGY | Facility: CLINIC | Age: 62
End: 2022-07-08
Payer: COMMERCIAL

## 2022-07-08 NOTE — TELEPHONE ENCOUNTER
I called scheduling and was told I need to speak to Tsering and she is out until Monday. I called and advised patient I will call her back on Monday as soon as I speak to Tsering.

## 2022-07-18 ENCOUNTER — HOSPITAL ENCOUNTER (OUTPATIENT)
Dept: RADIOLOGY | Facility: HOSPITAL | Age: 62
Discharge: HOME | End: 2022-07-18
Attending: NURSE PRACTITIONER
Payer: COMMERCIAL

## 2022-07-18 VITALS — WEIGHT: 170 LBS | BODY MASS INDEX: 29.02 KG/M2 | HEIGHT: 64 IN

## 2022-07-18 DIAGNOSIS — R92.8 ABNORMAL MAMMOGRAM: ICD-10-CM

## 2022-07-18 DIAGNOSIS — C50.411 MALIGNANT NEOPLASM OF UPPER-OUTER QUADRANT OF RIGHT FEMALE BREAST, UNSPECIFIED ESTROGEN RECEPTOR STATUS: Primary | ICD-10-CM

## 2022-07-18 PROCEDURE — 77065 DX MAMMO INCL CAD UNI: CPT | Mod: TC,LT

## 2022-07-18 PROCEDURE — 76642 US BREAST LEFT LIMITED: ICD-10-PCS | Mod: 26,LT,, | Performed by: RADIOLOGY

## 2022-07-18 PROCEDURE — 76642 ULTRASOUND BREAST LIMITED: CPT | Mod: 26,LT,, | Performed by: RADIOLOGY

## 2022-07-18 PROCEDURE — 77065 DX MAMMO INCL CAD UNI: CPT | Mod: 26,LT,, | Performed by: RADIOLOGY

## 2022-07-18 PROCEDURE — 76642 ULTRASOUND BREAST LIMITED: CPT | Mod: TC,LT

## 2022-07-18 PROCEDURE — 77061 BREAST TOMOSYNTHESIS UNI: CPT | Mod: 26,LT,, | Performed by: RADIOLOGY

## 2022-07-18 PROCEDURE — 77065 MAMMO DIGITAL DIAGNOSTIC LEFT WITH TOMO: ICD-10-PCS | Mod: 26,LT,, | Performed by: RADIOLOGY

## 2022-07-18 PROCEDURE — 77061 MAMMO DIGITAL DIAGNOSTIC LEFT WITH TOMO: ICD-10-PCS | Mod: 26,LT,, | Performed by: RADIOLOGY

## 2022-07-25 ENCOUNTER — HOSPITAL ENCOUNTER (OUTPATIENT)
Dept: RADIOLOGY | Facility: HOSPITAL | Age: 62
Discharge: HOME | End: 2022-07-25
Attending: NURSE PRACTITIONER
Payer: COMMERCIAL

## 2022-07-25 DIAGNOSIS — R92.8 ABNORMAL MAMMOGRAM: ICD-10-CM

## 2022-07-25 DIAGNOSIS — R92.8 ABNORMAL MAMMOGRAM: Primary | ICD-10-CM

## 2022-07-25 PROCEDURE — 19083 BX BREAST 1ST LESION US IMAG: CPT | Mod: LT,,, | Performed by: STUDENT IN AN ORGANIZED HEALTH CARE EDUCATION/TRAINING PROGRAM

## 2022-07-25 PROCEDURE — 19083 BX BREAST 1ST LESION US IMAG: CPT | Mod: LT

## 2022-07-25 PROCEDURE — 27000549 US BREAST BIOPSY WITH IMAGING 1ST SITE LEFT

## 2022-07-25 PROCEDURE — 77065 DX MAMMO INCL CAD UNI: CPT | Mod: TC,LT

## 2022-07-25 PROCEDURE — 77065 MAMMO DIGITAL DIAGNOSTIC LEFT WITH TOMO: ICD-10-PCS | Mod: 26,LT,, | Performed by: STUDENT IN AN ORGANIZED HEALTH CARE EDUCATION/TRAINING PROGRAM

## 2022-07-25 PROCEDURE — 77065 DX MAMMO INCL CAD UNI: CPT | Mod: 26,LT,, | Performed by: STUDENT IN AN ORGANIZED HEALTH CARE EDUCATION/TRAINING PROGRAM

## 2022-07-25 PROCEDURE — 77061 BREAST TOMOSYNTHESIS UNI: CPT | Mod: 26,LT,, | Performed by: STUDENT IN AN ORGANIZED HEALTH CARE EDUCATION/TRAINING PROGRAM

## 2022-07-25 PROCEDURE — 77061 MAMMO DIGITAL DIAGNOSTIC LEFT WITH TOMO: ICD-10-PCS | Mod: 26,LT,, | Performed by: STUDENT IN AN ORGANIZED HEALTH CARE EDUCATION/TRAINING PROGRAM

## 2022-07-25 PROCEDURE — 19083 US BREAST BIOPSY WITH IMAGING 1ST SITE LEFT: ICD-10-PCS | Mod: LT,,, | Performed by: STUDENT IN AN ORGANIZED HEALTH CARE EDUCATION/TRAINING PROGRAM

## 2022-07-27 LAB
ESTROGEN SERPL-MCNC: NORMAL PG/ML
INSULIN SERPL-ACNC: NORMAL U[IU]/ML
LAB AP CLINICAL INFORMATION: NORMAL
LAB AP GROSS DESCRIPTION: NORMAL
LAB AP REPORT FOOTNOTES: NORMAL
T3RU NFR SERPL: NORMAL %

## 2022-07-28 RX ORDER — EMPAGLIFLOZIN, METFORMIN HYDROCHLORIDE 12.5; 1 MG/1; MG/1
1 TABLET, EXTENDED RELEASE ORAL 2 TIMES DAILY
COMMUNITY
Start: 2022-05-06 | End: 2023-03-21 | Stop reason: SDUPTHER

## 2022-07-28 RX ORDER — INSULIN DEGLUDEC 200 U/ML
INJECTION, SOLUTION SUBCUTANEOUS
COMMUNITY
Start: 2022-07-18 | End: 2022-10-03

## 2022-07-28 RX ORDER — DULOXETIN HYDROCHLORIDE 60 MG/1
60 CAPSULE, DELAYED RELEASE ORAL DAILY
COMMUNITY
Start: 2022-07-05 | End: 2023-03-21 | Stop reason: SDUPTHER

## 2022-07-28 RX ORDER — CETIRIZINE HYDROCHLORIDE 10 MG/1
10 TABLET ORAL DAILY
COMMUNITY

## 2022-07-28 RX ORDER — RISEDRONATE SODIUM 35 MG/1
35 TABLET, FILM COATED ORAL WEEKLY
COMMUNITY
Start: 2022-05-27 | End: 2022-08-01 | Stop reason: ALTCHOICE

## 2022-07-28 RX ORDER — DULAGLUTIDE 0.75 MG/.5ML
INJECTION, SOLUTION SUBCUTANEOUS
COMMUNITY
Start: 2022-06-15 | End: 2023-03-16

## 2022-07-28 RX ORDER — PANTOPRAZOLE SODIUM 40 MG/1
40 TABLET, DELAYED RELEASE ORAL DAILY
COMMUNITY
Start: 2022-05-13 | End: 2023-03-21 | Stop reason: SDUPTHER

## 2022-07-28 RX ORDER — AMOXICILLIN 500 MG
2 CAPSULE ORAL DAILY
COMMUNITY
End: 2023-03-21

## 2022-07-28 RX ORDER — FLUTICASONE PROPIONATE 50 MCG
1 SPRAY, SUSPENSION (ML) NASAL 2 TIMES DAILY PRN
COMMUNITY
Start: 2022-03-16

## 2022-07-28 RX ORDER — GABAPENTIN 300 MG/1
300 CAPSULE ORAL 2 TIMES DAILY
COMMUNITY
Start: 2022-06-02 | End: 2023-03-21 | Stop reason: SDUPTHER

## 2022-07-28 RX ORDER — ATORVASTATIN CALCIUM 80 MG/1
80 TABLET, FILM COATED ORAL DAILY
COMMUNITY
Start: 2022-06-12 | End: 2023-03-21 | Stop reason: SDUPTHER

## 2022-07-28 RX ORDER — METOPROLOL SUCCINATE 25 MG/1
12.5 TABLET, EXTENDED RELEASE ORAL DAILY
COMMUNITY
Start: 2022-06-11 | End: 2023-03-21

## 2022-07-28 RX ORDER — LETROZOLE 2.5 MG/1
2.5 TABLET, FILM COATED ORAL DAILY
COMMUNITY
Start: 2022-06-21 | End: 2022-09-16

## 2022-07-28 NOTE — PROGRESS NOTES
Subjective:       Patient ID: Dede Castillo is a 61 y.o. female.    Chief Complaint:  I had another breast biopsy    HPI  Diagnosis: Stage IIA right breast cancer 6/16 (T2 N0 M0), 2.1 cm, grade II, ER/NC +, HER-2 +                    Postmenopausal s/p hysterectomy/BSO                    Iron deficiency anemia                    + COVID-19 vaccinated    Treatment History  TCH + Perjeta x 1 -->THP x 1 --> TCH x 3 ---> XRT ---> Herceptin x 1 yr (7/17)  IV Infed 7/22/20    Clinical History: Patient presented with an abnormal screening mammogram 6/1/16 showing a suspicious mass in the upper outer quadrant of the right breast.  Lesion measured 2.9 x 1.3 cm by ultrasound.  Ultrasound-guided biopsy was positive for invasive carcinoma with ductal and lobular features.  She underwent a right lumpectomy and sentinel lymph node dissection 6/23/16.  Final pathology revealed a 2.1 cm moderately differentiated infiltrating ductal carcinoma.  4 sentinel lymph nodes were negative for involvement.  ER +77%, NC +51% and HER-2 positive for overexpression by IHC and FISH.  Ki-67 expression was 13% (borderline).  There was no family history of breast cancer.  She was seen for a Medical Oncology opinion 7/21/16.  Tratment was recommended with a regimen of TCH + Perjeta for 6 cycles followed by single agent Herceptin every 3 weeks to complete one year of therapy.  She underwent placement of a left chest wall Mediport for faciliation of chemotherapy 8/5/16.  Baseline echocardiogram 8/1/16 showed normal LV size and function with an EF of 55%.  Chemotherapy was initiated 8/5/16.    She had significant toxicities following her 1st cycle of treatment requiring dose adjustment in treatment modifications.  She completed 5 cycles of adjuvant chemotherapy with Taxotere. Treatment was discontinued at that time secondary to progressive toxicities including severe fatigue, anemia, progressive fluid retention, peripheral edema, neuropathy and  myalgias. Herceptin was continued as a single agent every 3 weeks. She she completed adjuvant radiation therapy  with moderate toxicities requiring 2 brief treatment breaks. Initiation of hormonal therapy was delayed secondary to significant depression symptoms. She lost her sister and had progressive symptoms following a trial of Lyrica for her neuropathy. Her antidepressant regimen was changed by her primary care physician. Follow-up echocardiogram 17 showed a normal ejection fraction of 55%. She was started on adjuvant hormonal therapy with Femara .  Density from 17 showed osteopenia of the lumbar spine with a T score of -1.7 and osteopenia of both femoral necks with T-scores of -0.9 on the right and -1.2 on the left.  She was initially placed on Actonel which was poorly tolerated.  Once insurance approval is obtained, she was started on Prolia every 6 months.    Patient suffered a fall  resulting in a T12 compression fracture.  She underwent a T12 kyphoplasty by Dr. Feng 11/15/17. Mediport catheter became infected and was removed . Her   suddenly from a massive heart attack .  She was treated with IV Infed 20 for iron deficiency anemia with full recovery of her counts. She had a Cologuard test 10/20/20 which was negative. She was unable to tolerate the prep/NPO status for a colonoscopy.     Annual screening mammogram 22 showed a focal asymmetry in the left breast at 12:00 p.m. She reported a history of trauma to that area several weeks prior to the mammogram that caused visible bruising.  Diagnostic mammogram and left breast ultrasound 22 showed an overall mixed echogenicity mass with indistinct margins at 12:00 p.m..  Biopsy showed organizing hemorrhage and focal fat necrosis.    Interval History  She returns to the office today by herself for a 6 month surveillance visit.  She is on extended adjuvant hormonal therapy with Femara.  She continues to  "tolerate treatment well.  Her recent mammogram results are documented above.  She reports no changes on her self-breast examination.  Due to cost issues, she was changed from Prolia to Actonel.  She has significant myalgias and arthralgias on treatment.  Previous bone density exam 9/3/21 showed osteopenia of the lumbar spine with a T-score of -1.5, right hip -1.1 and left hip-1.2.  Findings were stable to improved from her previous exam.  She would like to consider going back on Prolia.    Review of Systems   Constitutional: Negative for appetite change, fatigue and fever.   HENT: Negative for mouth sores, sore throat and trouble swallowing.    Eyes: Negative.    Respiratory: Negative for cough, chest tightness and shortness of breath.    Cardiovascular: Negative for chest pain, palpitations and leg swelling.   Gastrointestinal: Negative for abdominal distention, abdominal pain, blood in stool, change in bowel habit, constipation, diarrhea, nausea, vomiting and change in bowel habit.   Genitourinary: Negative for dysuria, frequency and urgency.   Musculoskeletal: Positive for arthralgias and back pain (Chronic, lower back).   Integumentary:  Negative for rash and mole/lesion.   Hematological: Negative for adenopathy. Does not bruise/bleed easily.       PMHx:  Diabetes mellitus with LE neuropathy, asthma, hypercholesterolemia, MVP, GERD, anxiety, anemia, migraines      Menarche age 12, 1st pregnancy 15, + OCPs, hysterectomy/BSO 50, no HRT  PSHx:  Right lumpectomy, appendectomy, hysterectomy/BSO, cholecystectomy, right eye surgery age 4, cataracts  SH:  Lifetime nonsmoker, occasional alcohol use.  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    FH:  Her sister had uterine cancer.       Objective:        /74 (BP Location: Right arm, Patient Position: Sitting, BP Method: Large (Automatic))   Pulse 85   Temp 96.3 °F (35.7 °C)   Resp 18   Ht 5' 4" (1.626 m)   Wt 79 kg (174 lb 3.2 oz)   SpO2 98%   BMI " 29.90 kg/m²    Physical Exam  Constitutional:       Comments: Obese white female in no acute distress   HENT:      Head: Normocephalic.      Nose: Nose normal.      Mouth/Throat:      Mouth: Mucous membranes are moist.      Pharynx: Oropharynx is clear. No posterior oropharyngeal erythema.   Eyes:      Extraocular Movements: Extraocular movements intact.      Conjunctiva/sclera: Conjunctivae normal.      Pupils: Pupils are equal, round, and reactive to light.   Cardiovascular:      Rate and Rhythm: Normal rate and regular rhythm.      Heart sounds: No murmur heard.  Pulmonary:      Breath sounds: Normal breath sounds.   Chest:      Comments: Large breast bilaterally.  Well-healed incisions right UOQ and axilla.  Resolving ecchymoses left mid breast from previous biopsy.  No suspicious masses, skin changes or axillary nodes bilaterally.  Abdominal:      General: Bowel sounds are normal. There is no distension.      Palpations: Abdomen is soft. There is no mass.      Tenderness: There is no abdominal tenderness.   Musculoskeletal:         General: No swelling or tenderness. Normal range of motion.      Cervical back: Neck supple. No tenderness.   Lymphadenopathy:      Cervical: No cervical adenopathy.   Skin:     General: Skin is warm and dry.      Findings: No rash.   Neurological:      General: No focal deficit present.      Mental Status: She is alert and oriented to person, place, and time.      Cranial Nerves: No cranial nerve deficit.      Gait: Gait normal.       ECOG SCORE    0 - Fully active-able to carry on all pre-disease performance without restriction          LABORATORY  No results found for this or any previous visit (from the past 168 hour(s)).        Latest Reference Range & Units 07/25/22 09:06   WBC 4.5 - 11.5 x10(3)/mcL 14.5 (H)   RBC 4.20 - 5.40 x10(6)/mcL 4.11 (L)   Hemoglobin 12.0 - 16.0 gm/dL 12.7   Hematocrit 37.0 - 47.0 % 40.4   MCV 80.0 - 94.0 fL 98.3 (H)   MCH 27.0 - 31.0 pg 30.9   MCHC  33.0 - 36.0 mg/dL 31.4 (L)   RDW 11.5 - 17.0 % 13.8   Platelets 130 - 400 x10(3)/mcL 175   MPV 7.4 - 10.4 fL 8.9   Neut % % 81.3   LYMPH % % 9.7   Mono % % 5.7   Eosinophil % % 3.0   Basophil % % 0.2   Immature Granulocytes % 0.1   Neut # 2.1 - 9.2 x10(3)/mcL 11.8 (H)   Lymph # 0.6 - 4.6 x10(3)/mcL 1.40   Mono # 0.1 - 1.3 x10(3)/mcL 0.83   Eos # 0 - 0.9 x10(3)/mcL 0.44   Baso # 0 - 0.2 x10(3)/mcL 0.03   Immature Grans (Abs) 0 - 0.04 x10(3)/mcL 0.02   Sodium 136 - 145 mmol/L 142   Potassium 3.5 - 5.1 mmol/L 5.6 (H)   Chloride 98 - 107 mmol/L 104   CO2 23 - 31 mmol/L 27   BUN 9.8 - 20.1 mg/dL 24.0 (H)   Creatinine 0.55 - 1.02 mg/dL 1.34 (H)   eGFR if non African American mls/min/1.73/m2 43   Glucose 82 - 115 mg/dL 96   Calcium 8.4 - 10.2 mg/dL 9.6   Alkaline Phosphatase 40 - 150 unit/L 61   PROTEIN TOTAL 5.8 - 7.6 gm/dL 7.2   Albumin 3.4 - 4.8 gm/dL 4.0   Albumin/Globulin Ratio 1.1 - 2.0 ratio 1.3   BILIRUBIN TOTAL <=1.5 mg/dL 0.8   AST 5 - 34 unit/L 21   ALT 0 - 55 unit/L 21   Globulin, Total 2.4 - 3.5 gm/dL 3.2   CEA 0.00 - 3.00 ng/mL 3.63 (H)   Breast Carcinoma Assoc Ag(CA 27.29) <=38.0 U/mL 13.2       Assessment:   Stage II breast cancer - DERICK  Osteopenia  History of iron deficiency anemia      Plan:   Patient has no clinical findings suspicious for recurrence of her breast cancer.  Continue adjuvant hormonal therapy with Femara to complete 7 years of treatment.  Discontinue Actonel secondary to intolerable side effects.  Restart Prolia every 6 months for treatment of her bone density.  RTC in 6 months for a follow-up visit and clinical exam with repeat laboratory.  Repeat bilateral screening mammogram in June, 2023.       PAMELA CORDON MD    Other Physicians  Dr. Marin Diaz

## 2022-08-01 ENCOUNTER — OFFICE VISIT (OUTPATIENT)
Dept: HEMATOLOGY/ONCOLOGY | Facility: CLINIC | Age: 62
End: 2022-08-01
Payer: COMMERCIAL

## 2022-08-01 VITALS
DIASTOLIC BLOOD PRESSURE: 74 MMHG | HEART RATE: 85 BPM | RESPIRATION RATE: 18 BRPM | BODY MASS INDEX: 29.74 KG/M2 | SYSTOLIC BLOOD PRESSURE: 107 MMHG | HEIGHT: 64 IN | WEIGHT: 174.19 LBS | OXYGEN SATURATION: 98 % | TEMPERATURE: 96 F

## 2022-08-01 DIAGNOSIS — Z17.0 MALIGNANT NEOPLASM OF UPPER-OUTER QUADRANT OF RIGHT BREAST IN FEMALE, ESTROGEN RECEPTOR POSITIVE: Primary | ICD-10-CM

## 2022-08-01 DIAGNOSIS — C50.411 MALIGNANT NEOPLASM OF UPPER-OUTER QUADRANT OF RIGHT BREAST IN FEMALE, ESTROGEN RECEPTOR POSITIVE: Primary | ICD-10-CM

## 2022-08-01 DIAGNOSIS — M85.89 OSTEOPENIA OF MULTIPLE SITES: ICD-10-CM

## 2022-08-01 PROCEDURE — 99999 PR PBB SHADOW E&M-EST. PATIENT-LVL IV: CPT | Mod: PBBFAC,,, | Performed by: INTERNAL MEDICINE

## 2022-08-01 PROCEDURE — 99999 PR PBB SHADOW E&M-EST. PATIENT-LVL IV: ICD-10-PCS | Mod: PBBFAC,,, | Performed by: INTERNAL MEDICINE

## 2022-08-01 PROCEDURE — 3078F DIAST BP <80 MM HG: CPT | Mod: CPTII,S$GLB,, | Performed by: INTERNAL MEDICINE

## 2022-08-01 PROCEDURE — 3008F PR BODY MASS INDEX (BMI) DOCUMENTED: ICD-10-PCS | Mod: CPTII,S$GLB,, | Performed by: INTERNAL MEDICINE

## 2022-08-01 PROCEDURE — 1159F MED LIST DOCD IN RCRD: CPT | Mod: CPTII,S$GLB,, | Performed by: INTERNAL MEDICINE

## 2022-08-01 PROCEDURE — 99214 PR OFFICE/OUTPT VISIT, EST, LEVL IV, 30-39 MIN: ICD-10-PCS | Mod: S$GLB,,, | Performed by: INTERNAL MEDICINE

## 2022-08-01 PROCEDURE — 3074F PR MOST RECENT SYSTOLIC BLOOD PRESSURE < 130 MM HG: ICD-10-PCS | Mod: CPTII,S$GLB,, | Performed by: INTERNAL MEDICINE

## 2022-08-01 PROCEDURE — 99214 OFFICE O/P EST MOD 30 MIN: CPT | Mod: S$GLB,,, | Performed by: INTERNAL MEDICINE

## 2022-08-01 PROCEDURE — 3074F SYST BP LT 130 MM HG: CPT | Mod: CPTII,S$GLB,, | Performed by: INTERNAL MEDICINE

## 2022-08-01 PROCEDURE — 1160F PR REVIEW ALL MEDS BY PRESCRIBER/CLIN PHARMACIST DOCUMENTED: ICD-10-PCS | Mod: CPTII,S$GLB,, | Performed by: INTERNAL MEDICINE

## 2022-08-01 PROCEDURE — 3078F PR MOST RECENT DIASTOLIC BLOOD PRESSURE < 80 MM HG: ICD-10-PCS | Mod: CPTII,S$GLB,, | Performed by: INTERNAL MEDICINE

## 2022-08-01 PROCEDURE — 3008F BODY MASS INDEX DOCD: CPT | Mod: CPTII,S$GLB,, | Performed by: INTERNAL MEDICINE

## 2022-08-01 PROCEDURE — 1159F PR MEDICATION LIST DOCUMENTED IN MEDICAL RECORD: ICD-10-PCS | Mod: CPTII,S$GLB,, | Performed by: INTERNAL MEDICINE

## 2022-08-01 PROCEDURE — 1160F RVW MEDS BY RX/DR IN RCRD: CPT | Mod: CPTII,S$GLB,, | Performed by: INTERNAL MEDICINE

## 2022-08-29 ENCOUNTER — INFUSION (OUTPATIENT)
Dept: INFUSION THERAPY | Facility: HOSPITAL | Age: 62
End: 2022-08-29
Attending: INTERNAL MEDICINE
Payer: COMMERCIAL

## 2022-08-29 VITALS
HEART RATE: 94 BPM | RESPIRATION RATE: 18 BRPM | TEMPERATURE: 97 F | DIASTOLIC BLOOD PRESSURE: 68 MMHG | SYSTOLIC BLOOD PRESSURE: 102 MMHG

## 2022-08-29 DIAGNOSIS — M85.89 OSTEOPENIA OF MULTIPLE SITES: Primary | ICD-10-CM

## 2022-08-29 PROCEDURE — 96372 THER/PROPH/DIAG INJ SC/IM: CPT

## 2022-08-29 PROCEDURE — 63600175 PHARM REV CODE 636 W HCPCS: Mod: JG | Performed by: INTERNAL MEDICINE

## 2022-08-29 RX ADMIN — DENOSUMAB 60 MG: 60 INJECTION SUBCUTANEOUS at 10:08

## 2022-09-16 PROBLEM — E87.5 HYPERKALEMIA: Status: ACTIVE | Noted: 2022-09-16

## 2022-09-16 PROBLEM — D72.824 BASOPHILIC LEUKOCYTOSIS: Status: ACTIVE | Noted: 2022-09-16

## 2022-09-21 ENCOUNTER — HISTORICAL (OUTPATIENT)
Dept: ADMINISTRATIVE | Facility: HOSPITAL | Age: 62
End: 2022-09-21
Payer: COMMERCIAL

## 2022-11-07 PROBLEM — Z23 IMMUNIZATION DUE: Status: ACTIVE | Noted: 2022-11-07

## 2022-11-07 PROBLEM — R51.9 RIGHT FACIAL PAIN: Status: ACTIVE | Noted: 2022-11-07

## 2023-01-31 DIAGNOSIS — C50.411 MALIGNANT NEOPLASM OF UPPER-OUTER QUADRANT OF RIGHT BREAST IN FEMALE, ESTROGEN RECEPTOR POSITIVE: Primary | ICD-10-CM

## 2023-01-31 DIAGNOSIS — Z17.0 MALIGNANT NEOPLASM OF UPPER-OUTER QUADRANT OF RIGHT BREAST IN FEMALE, ESTROGEN RECEPTOR POSITIVE: Primary | ICD-10-CM

## 2023-02-01 ENCOUNTER — TELEPHONE (OUTPATIENT)
Dept: HEMATOLOGY/ONCOLOGY | Facility: CLINIC | Age: 63
End: 2023-02-01
Payer: COMMERCIAL

## 2023-02-01 ENCOUNTER — OFFICE VISIT (OUTPATIENT)
Dept: HEMATOLOGY/ONCOLOGY | Facility: CLINIC | Age: 63
End: 2023-02-01
Payer: COMMERCIAL

## 2023-02-01 VITALS
RESPIRATION RATE: 18 BRPM | SYSTOLIC BLOOD PRESSURE: 109 MMHG | TEMPERATURE: 96 F | HEIGHT: 61 IN | OXYGEN SATURATION: 97 % | WEIGHT: 161.19 LBS | DIASTOLIC BLOOD PRESSURE: 70 MMHG | HEART RATE: 92 BPM | BODY MASS INDEX: 30.43 KG/M2

## 2023-02-01 DIAGNOSIS — Z17.0 MALIGNANT NEOPLASM OF UPPER-OUTER QUADRANT OF RIGHT BREAST IN FEMALE, ESTROGEN RECEPTOR POSITIVE: ICD-10-CM

## 2023-02-01 DIAGNOSIS — E87.5 HYPERKALEMIA: ICD-10-CM

## 2023-02-01 DIAGNOSIS — Z12.31 BREAST CANCER SCREENING BY MAMMOGRAM: ICD-10-CM

## 2023-02-01 DIAGNOSIS — C50.411 MALIGNANT NEOPLASM OF UPPER-OUTER QUADRANT OF RIGHT BREAST IN FEMALE, ESTROGEN RECEPTOR POSITIVE: ICD-10-CM

## 2023-02-01 DIAGNOSIS — M85.89 OSTEOPENIA OF MULTIPLE SITES: Primary | ICD-10-CM

## 2023-02-01 PROCEDURE — 3074F PR MOST RECENT SYSTOLIC BLOOD PRESSURE < 130 MM HG: ICD-10-PCS | Mod: CPTII,S$GLB,, | Performed by: NURSE PRACTITIONER

## 2023-02-01 PROCEDURE — 3008F PR BODY MASS INDEX (BMI) DOCUMENTED: ICD-10-PCS | Mod: CPTII,S$GLB,, | Performed by: NURSE PRACTITIONER

## 2023-02-01 PROCEDURE — 3074F SYST BP LT 130 MM HG: CPT | Mod: CPTII,S$GLB,, | Performed by: NURSE PRACTITIONER

## 2023-02-01 PROCEDURE — 3008F BODY MASS INDEX DOCD: CPT | Mod: CPTII,S$GLB,, | Performed by: NURSE PRACTITIONER

## 2023-02-01 PROCEDURE — 1159F MED LIST DOCD IN RCRD: CPT | Mod: CPTII,S$GLB,, | Performed by: NURSE PRACTITIONER

## 2023-02-01 PROCEDURE — 99214 OFFICE O/P EST MOD 30 MIN: CPT | Mod: S$GLB,,, | Performed by: NURSE PRACTITIONER

## 2023-02-01 PROCEDURE — 1160F RVW MEDS BY RX/DR IN RCRD: CPT | Mod: CPTII,S$GLB,, | Performed by: NURSE PRACTITIONER

## 2023-02-01 PROCEDURE — 1159F PR MEDICATION LIST DOCUMENTED IN MEDICAL RECORD: ICD-10-PCS | Mod: CPTII,S$GLB,, | Performed by: NURSE PRACTITIONER

## 2023-02-01 PROCEDURE — 3078F PR MOST RECENT DIASTOLIC BLOOD PRESSURE < 80 MM HG: ICD-10-PCS | Mod: CPTII,S$GLB,, | Performed by: NURSE PRACTITIONER

## 2023-02-01 PROCEDURE — 3078F DIAST BP <80 MM HG: CPT | Mod: CPTII,S$GLB,, | Performed by: NURSE PRACTITIONER

## 2023-02-01 PROCEDURE — 99999 PR PBB SHADOW E&M-EST. PATIENT-LVL V: CPT | Mod: PBBFAC,,, | Performed by: NURSE PRACTITIONER

## 2023-02-01 PROCEDURE — 1160F PR REVIEW ALL MEDS BY PRESCRIBER/CLIN PHARMACIST DOCUMENTED: ICD-10-PCS | Mod: CPTII,S$GLB,, | Performed by: NURSE PRACTITIONER

## 2023-02-01 PROCEDURE — 99214 PR OFFICE/OUTPT VISIT, EST, LEVL IV, 30-39 MIN: ICD-10-PCS | Mod: S$GLB,,, | Performed by: NURSE PRACTITIONER

## 2023-02-01 PROCEDURE — 99999 PR PBB SHADOW E&M-EST. PATIENT-LVL V: ICD-10-PCS | Mod: PBBFAC,,, | Performed by: NURSE PRACTITIONER

## 2023-02-01 NOTE — PROGRESS NOTES
Subjective:       Patient ID: Dede Castillo is a 62 y.o. female.    Chief Complaint:  Doing about the same    HPI  Diagnosis: Stage IIA right breast cancer 6/16 (T2 N0 M0), 2.1 cm, grade II, ER/VA +, HER-2 +                    Postmenopausal s/p hysterectomy/BSO                    Iron deficiency anemia                    + COVID-19 vaccinated    Current Treatment: Femara 2.5 mg started 4/17    Treatment History  TCH + Perjeta x 1 -->THP x 1 --> TCH x 3 ---> XRT ---> Herceptin x 1 yr (7/17)  IV Infed 7/22/20    Clinical History: Patient presented with an abnormal screening mammogram 6/1/16 showing a suspicious mass in the upper outer quadrant of the right breast.  Lesion measured 2.9 x 1.3 cm by ultrasound.  Ultrasound-guided biopsy was positive for invasive carcinoma with ductal and lobular features.  She underwent a right lumpectomy and sentinel lymph node dissection 6/23/16.  Final pathology revealed a 2.1 cm moderately differentiated infiltrating ductal carcinoma.  4 sentinel lymph nodes were negative for involvement.  ER +77%, VA +51% and HER-2 positive for overexpression by IHC and FISH.  Ki-67 expression was 13% (borderline).  There was no family history of breast cancer.  She was seen for a Medical Oncology opinion 7/21/16.  Tratment was recommended with a regimen of TCH + Perjeta for 6 cycles followed by single agent Herceptin every 3 weeks to complete one year of therapy.  She underwent placement of a left chest wall Mediport for faciliation of chemotherapy 8/5/16.  Baseline echocardiogram 8/1/16 showed normal LV size and function with an EF of 55%.  Chemotherapy was initiated 8/5/16.    She had significant toxicities following her 1st cycle of treatment requiring dose adjustment in treatment modifications.  She completed 5 cycles of adjuvant chemotherapy with Taxotere. Treatment was discontinued at that time secondary to progressive toxicities including severe fatigue, anemia, progressive fluid  retention, peripheral edema, neuropathy and myalgias. Herceptin was continued as a single agent every 3 weeks. She she completed adjuvant radiation therapy  with moderate toxicities requiring 2 brief treatment breaks. Initiation of hormonal therapy was delayed secondary to significant depression symptoms. She lost her sister and had progressive symptoms following a trial of Lyrica for her neuropathy. Her antidepressant regimen was changed by her primary care physician. Follow-up echocardiogram 17 showed a normal ejection fraction of 55%. She was started on adjuvant hormonal therapy with Femara .  Density from 17 showed osteopenia of the lumbar spine with a T score of -1.7 and osteopenia of both femoral necks with T-scores of -0.9 on the right and -1.2 on the left.  She was initially placed on Actonel which was poorly tolerated.  Once insurance approval is obtained, she was started on Prolia every 6 months.    Patient suffered a fall  resulting in a T12 compression fracture.  She underwent a T12 kyphoplasty by Dr. Feng 11/15/17. Mediport catheter became infected and was removed . Her   suddenly from a massive heart attack .  She was treated with IV Infed 20 for iron deficiency anemia with full recovery of her counts. She had a Cologuard test 10/20/20 which was negative. She was unable to tolerate the prep/NPO status for a colonoscopy.     Annual screening mammogram 22 showed a focal asymmetry in the left breast at 12:00 p.m. She reported a history of trauma to that area several weeks prior to the mammogram that caused visible bruising.  Diagnostic mammogram and left breast ultrasound 22 showed an overall mixed echogenicity mass with indistinct margins at 12:00 p.m..  Biopsy showed organizing hemorrhage and focal fat necrosis.    Previous bone density exam 9/3/21 showed osteopenia of the lumbar spine with a T-score of -1.5, right hip -1.1 and left hip-1.2.   Findings were stable to improved from her previous exam.      Interval History  Patient returns they were herself for a six-month breast cancer surveillance visit.  She is taking her adjuvant Femara daily as directed.  She has completed nearly 6 years of treatment.  She is receiving Prolia every 6 months for treatment of her bone density.  She is due for a follow-up bone density exam 9/23.  She denies any changes to her self-breast exam.  Mammogram is due in June.  She has chronic nausea and constipation.  She has lost approximately 10 lb since her last visit.  She was encouraged to schedule an appointment with Gastroenterology for evaluation.  She underwent a brain MRI 12/1/22 after experiencing headaches with neuropathic features involving the right side of the face.  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 Negative for appetite change.   HENT:  Negative for mouth sores.    Eyes:  Positive for visual disturbance.   Respiratory:  Negative for cough and shortness of breath.    Cardiovascular:  Negative for chest pain.   Gastrointestinal:  Positive for constipation and nausea. Negative for abdominal pain and diarrhea.   Genitourinary:  Negative for frequency.   Musculoskeletal:  Negative for back pain.   Integumentary:  Negative for rash.   Neurological:  Positive for headaches.   Hematological:  Negative for adenopathy.   Psychiatric/Behavioral:  The patient is not nervous/anxious.      PMHx:  Diabetes mellitus with LE neuropathy, asthma, hypercholesterolemia, MVP, GERD, anxiety, anemia, migraines, Menarche age 12, 1st pregnancy 15, + OCPs, hysterectomy/BSO 50, no HRT  PSHx:  Right lumpectomy, appendectomy, hysterectomy/BSO, cholecystectomy, right eye surgery age 4, cataracts  SH:  Lifetime nonsmoker, occasional alcohol use.   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    FH:  Her sister had uterine cancer.       Objective:        /70   Pulse 92   Temp (!) 95.9 °F (35.5 °C)   Resp 18   Ht 5' 1" (1.549 m)   Wt 73.1 kg (161 lb 3.2 oz)   SpO2 97%   BMI 30.46 kg/m²    Physical Exam  Constitutional:       Comments: Obese white female in no acute distress   HENT:      Head: Normocephalic.      Nose: Nose normal.      Mouth/Throat:      Mouth: Mucous membranes are moist.      Pharynx: Oropharynx is clear. No posterior oropharyngeal erythema.   Eyes:      Extraocular Movements: Extraocular movements intact.      Conjunctiva/sclera: Conjunctivae normal.      Pupils: Pupils are equal, round, and reactive to light.   Cardiovascular:      Rate and Rhythm: Normal rate and regular rhythm.      Heart sounds: No murmur heard.  Pulmonary:      Breath sounds: Normal breath sounds.   Chest:      Comments: Large breast bilaterally.  Well-healed incisions right UOQ and axilla.  Resolving ecchymoses left mid breast from previous biopsy.  No suspicious masses, skin changes or axillary nodes bilaterally.  Abdominal:      General: Bowel sounds are normal. There is no distension.      Palpations: Abdomen is soft. There is no mass.      Tenderness: There is no abdominal tenderness.   Musculoskeletal:         General: No swelling or tenderness. Normal range of motion.      Cervical back: Neck supple. No tenderness.   Lymphadenopathy:      Cervical: No cervical adenopathy.   Skin:     General: Skin is warm and dry.      Findings: No rash.   Neurological:      General: No focal deficit present.      Mental Status: She is alert and oriented to person, place, and time.      Cranial Nerves: No cranial nerve deficit.      Gait: Gait normal.     ECOG SCORE    1 - Restricted in strenuous activity-ambulatory and able to carry out work of a light nature          LABORATORY  Recent Results (from the past 336 hour(s))   CEA    Collection Time: 02/01/23  8:40 " AM   Result Value Ref Range    Carcinoembryonic Antigen 2.60 0.00 - 3.00 ng/mL   Comprehensive Metabolic Panel    Collection Time: 02/01/23  8:40 AM   Result Value Ref Range    Sodium Level 145 136 - 145 mmol/L    Potassium Level 6.4 (HH) 3.5 - 5.1 mmol/L    Chloride 109 (H) 98 - 107 mmol/L    Carbon Dioxide 27 23 - 31 mmol/L    Glucose Level 88 82 - 115 mg/dL    Blood Urea Nitrogen 20.3 (H) 9.8 - 20.1 mg/dL    Creatinine 1.12 (H) 0.55 - 1.02 mg/dL    Calcium Level Total 10.5 (H) 8.4 - 10.2 mg/dL    Protein Total 7.0 5.8 - 7.6 gm/dL    Albumin Level 3.9 3.4 - 4.8 g/dL    Globulin 3.1 2.4 - 3.5 gm/dL    Albumin/Globulin Ratio 1.3 1.1 - 2.0 ratio    Bilirubin Total 0.6 <=1.5 mg/dL    Alkaline Phosphatase 54 40 - 150 unit/L    Alanine Aminotransferase 18 0 - 55 unit/L    Aspartate Aminotransferase 18 5 - 34 unit/L    eGFR 55 mls/min/1.73/m2   CBC with Differential    Collection Time: 02/01/23  8:40 AM   Result Value Ref Range    WBC 10.2 4.5 - 11.5 x10(3)/mcL    RBC 4.04 (L) 4.20 - 5.40 x10(6)/mcL    Hgb 12.3 12.0 - 16.0 gm/dL    Hct 40.8 37.0 - 47.0 %    .0 (H) 80.0 - 94.0 fL    MCH 30.4 pg    MCHC 30.1 (L) 33.0 - 36.0 mg/dL    RDW 14.5 11.5 - 17.0 %    Platelet 172 130 - 400 x10(3)/mcL    MPV 9.2 7.4 - 10.4 fL    Neut % 67.3 %    Lymph % 16.8 %    Mono % 6.6 %    Eos % 8.6 %    Basophil % 0.5 %    Lymph # 1.71 0.6 - 4.6 x10(3)/mcL    Neut # 6.87 2.1 - 9.2 x10(3)/mcL    Mono # 0.67 0.1 - 1.3 x10(3)/mcL    Eos # 0.88 0 - 0.9 x10(3)/mcL    Baso # 0.05 0 - 0.2 x10(3)/mcL    IG# 0.02 0 - 0.04 x10(3)/mcL    IG% 0.2 %                 Assessment:   Stage II breast cancer - DERICK  Osteopenia  History of iron deficiency anemia  Hyperkalemia    Plan:   Patient has no clinical or laboratory findings worrisome for recurrent breast cancer.    Continue endocrine therapy with Femara.    Continue Prolia for treatment of bone density.    Mammogram due 6/23.    Follow-up bone density exam due 9/23.    Laboratory does show  hyperkalemia at 6.4.  Patient will have a repeat test.  If hyperkalemia confirmed, treated with Kayexalate.    Otherwise return to clinic in 6 months for continued breast cancer surveillance.  All questions answered.    PAKO MIRANDA, FNP-C    Other Physicians  Dr. Marin Diaz

## 2023-02-24 ENCOUNTER — INFUSION (OUTPATIENT)
Dept: INFUSION THERAPY | Facility: HOSPITAL | Age: 63
End: 2023-02-24
Attending: INTERNAL MEDICINE
Payer: COMMERCIAL

## 2023-02-24 VITALS
DIASTOLIC BLOOD PRESSURE: 65 MMHG | BODY MASS INDEX: 30.38 KG/M2 | OXYGEN SATURATION: 96 % | HEART RATE: 90 BPM | RESPIRATION RATE: 18 BRPM | TEMPERATURE: 99 F | SYSTOLIC BLOOD PRESSURE: 103 MMHG | WEIGHT: 160.81 LBS

## 2023-02-24 DIAGNOSIS — M85.89 OSTEOPENIA OF MULTIPLE SITES: Primary | ICD-10-CM

## 2023-02-24 PROCEDURE — 63600175 PHARM REV CODE 636 W HCPCS: Mod: JG | Performed by: INTERNAL MEDICINE

## 2023-02-24 PROCEDURE — 96372 THER/PROPH/DIAG INJ SC/IM: CPT

## 2023-02-24 RX ADMIN — DENOSUMAB 60 MG: 60 INJECTION SUBCUTANEOUS at 01:02

## 2023-02-24 NOTE — PLAN OF CARE
Patient received Prolia injection, tolerated well. Next appointment given. Discharge in stable condition.

## 2023-03-21 PROBLEM — K59.04 CHRONIC IDIOPATHIC CONSTIPATION: Status: ACTIVE | Noted: 2023-03-21

## 2023-03-21 PROBLEM — E55.9 VITAMIN D DEFICIENCY: Status: ACTIVE | Noted: 2023-03-21

## 2023-03-21 PROBLEM — Z00.00 ENCOUNTER FOR WELLNESS EXAMINATION IN ADULT: Status: ACTIVE | Noted: 2023-03-21

## 2023-03-21 PROCEDURE — 83690 ASSAY OF LIPASE: CPT | Performed by: FAMILY MEDICINE

## 2023-03-21 PROCEDURE — 82150 ASSAY OF AMYLASE: CPT | Performed by: FAMILY MEDICINE

## 2023-03-21 PROCEDURE — 84436 ASSAY OF TOTAL THYROXINE: CPT | Performed by: FAMILY MEDICINE

## 2023-06-26 PROBLEM — Z00.00 ENCOUNTER FOR WELLNESS EXAMINATION IN ADULT: Status: RESOLVED | Noted: 2023-03-21 | Resolved: 2023-06-26

## 2023-07-03 ENCOUNTER — HOSPITAL ENCOUNTER (OUTPATIENT)
Dept: RADIOLOGY | Facility: HOSPITAL | Age: 63
Discharge: HOME | End: 2023-07-03
Attending: NURSE PRACTITIONER
Payer: COMMERCIAL

## 2023-07-03 DIAGNOSIS — Z12.31 BREAST CANCER SCREENING BY MAMMOGRAM: ICD-10-CM

## 2023-07-03 PROCEDURE — 77063 BREAST TOMOSYNTHESIS BI: CPT | Mod: 26,,, | Performed by: STUDENT IN AN ORGANIZED HEALTH CARE EDUCATION/TRAINING PROGRAM

## 2023-07-03 PROCEDURE — 77063 MAMMO DIGITAL SCREENING BILAT WITH TOMO: ICD-10-PCS | Mod: 26,,, | Performed by: STUDENT IN AN ORGANIZED HEALTH CARE EDUCATION/TRAINING PROGRAM

## 2023-07-03 PROCEDURE — 77067 SCR MAMMO BI INCL CAD: CPT | Mod: TC

## 2023-07-03 PROCEDURE — 77067 MAMMO DIGITAL SCREENING BILAT WITH TOMO: ICD-10-PCS | Mod: 26,,, | Performed by: STUDENT IN AN ORGANIZED HEALTH CARE EDUCATION/TRAINING PROGRAM

## 2023-07-03 PROCEDURE — 77067 SCR MAMMO BI INCL CAD: CPT | Mod: 26,,, | Performed by: STUDENT IN AN ORGANIZED HEALTH CARE EDUCATION/TRAINING PROGRAM

## 2023-08-25 DIAGNOSIS — Z17.0 MALIGNANT NEOPLASM OF UPPER-OUTER QUADRANT OF RIGHT BREAST IN FEMALE, ESTROGEN RECEPTOR POSITIVE: Primary | ICD-10-CM

## 2023-08-25 DIAGNOSIS — M85.89 OSTEOPENIA OF MULTIPLE SITES: ICD-10-CM

## 2023-08-25 DIAGNOSIS — C50.411 MALIGNANT NEOPLASM OF UPPER-OUTER QUADRANT OF RIGHT BREAST IN FEMALE, ESTROGEN RECEPTOR POSITIVE: Primary | ICD-10-CM

## 2023-08-28 ENCOUNTER — INFUSION (OUTPATIENT)
Dept: INFUSION THERAPY | Facility: HOSPITAL | Age: 63
End: 2023-08-28
Attending: FAMILY MEDICINE
Payer: COMMERCIAL

## 2023-08-28 VITALS — DIASTOLIC BLOOD PRESSURE: 65 MMHG | HEART RATE: 88 BPM | SYSTOLIC BLOOD PRESSURE: 100 MMHG | RESPIRATION RATE: 16 BRPM

## 2023-08-28 DIAGNOSIS — M85.89 OSTEOPENIA OF MULTIPLE SITES: Primary | ICD-10-CM

## 2023-08-28 PROCEDURE — 63600175 PHARM REV CODE 636 W HCPCS: Mod: JZ,JG | Performed by: NURSE PRACTITIONER

## 2023-08-28 PROCEDURE — 96372 THER/PROPH/DIAG INJ SC/IM: CPT

## 2023-08-28 RX ADMIN — DENOSUMAB 60 MG: 60 INJECTION SUBCUTANEOUS at 01:08

## 2023-09-05 ENCOUNTER — HOSPITAL ENCOUNTER (OUTPATIENT)
Dept: RADIOLOGY | Facility: HOSPITAL | Age: 63
Discharge: HOME | End: 2023-09-05
Attending: NURSE PRACTITIONER
Payer: COMMERCIAL

## 2023-09-05 DIAGNOSIS — C50.411 MALIGNANT NEOPLASM OF UPPER-OUTER QUADRANT OF RIGHT BREAST IN FEMALE, ESTROGEN RECEPTOR POSITIVE: ICD-10-CM

## 2023-09-05 DIAGNOSIS — M85.89 OSTEOPENIA OF MULTIPLE SITES: ICD-10-CM

## 2023-09-05 DIAGNOSIS — Z17.0 MALIGNANT NEOPLASM OF UPPER-OUTER QUADRANT OF RIGHT BREAST IN FEMALE, ESTROGEN RECEPTOR POSITIVE: ICD-10-CM

## 2023-09-05 PROCEDURE — 77080 DXA BONE DENSITY AXIAL: CPT | Mod: 26,,, | Performed by: RADIOLOGY

## 2023-09-05 PROCEDURE — 77080 DXA BONE DENSITY AXIAL SKELETON 1 OR MORE SITES: ICD-10-PCS | Mod: 26,,, | Performed by: RADIOLOGY

## 2023-09-05 PROCEDURE — 77080 DXA BONE DENSITY AXIAL: CPT | Mod: TC

## 2023-09-08 DIAGNOSIS — Z17.0 MALIGNANT NEOPLASM OF UPPER-OUTER QUADRANT OF RIGHT BREAST IN FEMALE, ESTROGEN RECEPTOR POSITIVE: ICD-10-CM

## 2023-09-08 DIAGNOSIS — C50.411 MALIGNANT NEOPLASM OF UPPER-OUTER QUADRANT OF RIGHT BREAST IN FEMALE, ESTROGEN RECEPTOR POSITIVE: ICD-10-CM

## 2023-09-08 RX ORDER — LETROZOLE 2.5 MG/1
TABLET, FILM COATED ORAL
Qty: 90 TABLET | Refills: 0 | Status: SHIPPED | OUTPATIENT
Start: 2023-09-08 | End: 2023-12-05 | Stop reason: SDUPTHER

## 2023-09-11 ENCOUNTER — OFFICE VISIT (OUTPATIENT)
Dept: HEMATOLOGY/ONCOLOGY | Facility: CLINIC | Age: 63
End: 2023-09-11
Payer: COMMERCIAL

## 2023-09-11 VITALS
TEMPERATURE: 98 F | SYSTOLIC BLOOD PRESSURE: 107 MMHG | HEART RATE: 71 BPM | RESPIRATION RATE: 16 BRPM | OXYGEN SATURATION: 97 % | WEIGHT: 150.69 LBS | DIASTOLIC BLOOD PRESSURE: 69 MMHG | BODY MASS INDEX: 28.45 KG/M2 | HEIGHT: 61 IN

## 2023-09-11 DIAGNOSIS — Z17.0 MALIGNANT NEOPLASM OF UPPER-OUTER QUADRANT OF RIGHT BREAST IN FEMALE, ESTROGEN RECEPTOR POSITIVE: ICD-10-CM

## 2023-09-11 DIAGNOSIS — N30.01 ACUTE CYSTITIS WITH HEMATURIA: Primary | ICD-10-CM

## 2023-09-11 DIAGNOSIS — R82.90 CLOUDY URINE: ICD-10-CM

## 2023-09-11 DIAGNOSIS — N18.31 CHRONIC KIDNEY DISEASE, STAGE 3A: Primary | ICD-10-CM

## 2023-09-11 DIAGNOSIS — C50.411 MALIGNANT NEOPLASM OF UPPER-OUTER QUADRANT OF RIGHT BREAST IN FEMALE, ESTROGEN RECEPTOR POSITIVE: ICD-10-CM

## 2023-09-11 LAB
APPEARANCE UR: ABNORMAL
BACTERIA #/AREA URNS AUTO: ABNORMAL /HPF
BILIRUB UR QL STRIP.AUTO: NEGATIVE
COLOR UR: YELLOW
GLUCOSE UR QL STRIP.AUTO: ABNORMAL
KETONES UR QL STRIP.AUTO: ABNORMAL
LEUKOCYTE ESTERASE UR QL STRIP.AUTO: ABNORMAL
NITRITE UR QL STRIP.AUTO: NEGATIVE
PH UR STRIP.AUTO: 5 [PH]
PROT UR QL STRIP.AUTO: ABNORMAL
RBC #/AREA URNS AUTO: >100 /HPF
RBC UR QL AUTO: ABNORMAL
SP GR UR STRIP.AUTO: 1.03 (ref 1–1.03)
SQUAMOUS #/AREA URNS AUTO: ABNORMAL /HPF
UROBILINOGEN UR STRIP-ACNC: 1
WBC #/AREA URNS AUTO: >100 /HPF

## 2023-09-11 PROCEDURE — 3044F HG A1C LEVEL LT 7.0%: CPT | Mod: CPTII,S$GLB,, | Performed by: NURSE PRACTITIONER

## 2023-09-11 PROCEDURE — 3078F DIAST BP <80 MM HG: CPT | Mod: CPTII,S$GLB,, | Performed by: NURSE PRACTITIONER

## 2023-09-11 PROCEDURE — 3061F PR NEG MICROALBUMINURIA RESULT DOCUMENTED/REVIEW: ICD-10-PCS | Mod: CPTII,S$GLB,, | Performed by: NURSE PRACTITIONER

## 2023-09-11 PROCEDURE — 99214 OFFICE O/P EST MOD 30 MIN: CPT | Mod: S$GLB,,, | Performed by: NURSE PRACTITIONER

## 2023-09-11 PROCEDURE — 3008F BODY MASS INDEX DOCD: CPT | Mod: CPTII,S$GLB,, | Performed by: NURSE PRACTITIONER

## 2023-09-11 PROCEDURE — 99999 PR PBB SHADOW E&M-EST. PATIENT-LVL V: ICD-10-PCS | Mod: PBBFAC,,, | Performed by: NURSE PRACTITIONER

## 2023-09-11 PROCEDURE — 87077 CULTURE AEROBIC IDENTIFY: CPT | Performed by: NURSE PRACTITIONER

## 2023-09-11 PROCEDURE — 3074F SYST BP LT 130 MM HG: CPT | Mod: CPTII,S$GLB,, | Performed by: NURSE PRACTITIONER

## 2023-09-11 PROCEDURE — 3078F PR MOST RECENT DIASTOLIC BLOOD PRESSURE < 80 MM HG: ICD-10-PCS | Mod: CPTII,S$GLB,, | Performed by: NURSE PRACTITIONER

## 2023-09-11 PROCEDURE — 3061F NEG MICROALBUMINURIA REV: CPT | Mod: CPTII,S$GLB,, | Performed by: NURSE PRACTITIONER

## 2023-09-11 PROCEDURE — 3074F PR MOST RECENT SYSTOLIC BLOOD PRESSURE < 130 MM HG: ICD-10-PCS | Mod: CPTII,S$GLB,, | Performed by: NURSE PRACTITIONER

## 2023-09-11 PROCEDURE — 3008F PR BODY MASS INDEX (BMI) DOCUMENTED: ICD-10-PCS | Mod: CPTII,S$GLB,, | Performed by: NURSE PRACTITIONER

## 2023-09-11 PROCEDURE — 81001 URINALYSIS AUTO W/SCOPE: CPT | Performed by: NURSE PRACTITIONER

## 2023-09-11 PROCEDURE — 3066F NEPHROPATHY DOC TX: CPT | Mod: CPTII,S$GLB,, | Performed by: NURSE PRACTITIONER

## 2023-09-11 PROCEDURE — 99214 PR OFFICE/OUTPT VISIT, EST, LEVL IV, 30-39 MIN: ICD-10-PCS | Mod: S$GLB,,, | Performed by: NURSE PRACTITIONER

## 2023-09-11 PROCEDURE — 1159F MED LIST DOCD IN RCRD: CPT | Mod: CPTII,S$GLB,, | Performed by: NURSE PRACTITIONER

## 2023-09-11 PROCEDURE — 3066F PR DOCUMENTATION OF TREATMENT FOR NEPHROPATHY: ICD-10-PCS | Mod: CPTII,S$GLB,, | Performed by: NURSE PRACTITIONER

## 2023-09-11 PROCEDURE — 1159F PR MEDICATION LIST DOCUMENTED IN MEDICAL RECORD: ICD-10-PCS | Mod: CPTII,S$GLB,, | Performed by: NURSE PRACTITIONER

## 2023-09-11 PROCEDURE — 3044F PR MOST RECENT HEMOGLOBIN A1C LEVEL <7.0%: ICD-10-PCS | Mod: CPTII,S$GLB,, | Performed by: NURSE PRACTITIONER

## 2023-09-11 PROCEDURE — 99999 PR PBB SHADOW E&M-EST. PATIENT-LVL V: CPT | Mod: PBBFAC,,, | Performed by: NURSE PRACTITIONER

## 2023-09-11 RX ORDER — CIPROFLOXACIN 500 MG/1
500 TABLET ORAL EVERY 12 HOURS
Qty: 14 TABLET | Refills: 0 | Status: SHIPPED | OUTPATIENT
Start: 2023-09-11 | End: 2023-09-18

## 2023-09-11 NOTE — PROGRESS NOTES
Subjective:       Patient ID: Dede Castillo is a 62 y.o. female.    Chief Complaint:  My urine is cloudy  HPI  Diagnosis: Stage IIA right breast cancer 6/16 (T2 N0 M0), 2.1 cm, grade II, ER/CO +, HER-2 +                    Postmenopausal s/p hysterectomy/BSO                    Iron deficiency anemia                    + COVID-19 vaccinated    Current Treatment: Femara 2.5 mg started 4/17    Treatment History  TCH + Perjeta x 1 -->THP x 1 --> TCH x 3 ---> XRT ---> Herceptin x 1 yr (7/17)  IV Infed 7/22/20    Clinical History: Patient presented with an abnormal screening mammogram 6/1/16 showing a suspicious mass in the upper outer quadrant of the right breast.  Lesion measured 2.9 x 1.3 cm by ultrasound.  Ultrasound-guided biopsy was positive for invasive carcinoma with ductal and lobular features.  She underwent a right lumpectomy and sentinel lymph node dissection 6/23/16.  Final pathology revealed a 2.1 cm moderately differentiated infiltrating ductal carcinoma.  4 sentinel lymph nodes were negative for involvement.  ER +77%, CO +51% and HER-2 positive for overexpression by IHC and FISH.  Ki-67 expression was 13% (borderline).  There was no family history of breast cancer.  She was seen for a Medical Oncology opinion 7/21/16.  Tratment was recommended with a regimen of TCH + Perjeta for 6 cycles followed by single agent Herceptin every 3 weeks to complete one year of therapy.  She underwent placement of a left chest wall Mediport for faciliation of chemotherapy 8/5/16.  Baseline echocardiogram 8/1/16 showed normal LV size and function with an EF of 55%.  Chemotherapy was initiated 8/5/16.    She had significant toxicities following her 1st cycle of treatment requiring dose adjustment in treatment modifications.  She completed 5 cycles of adjuvant chemotherapy with Taxotere. Treatment was discontinued at that time secondary to progressive toxicities including severe fatigue, anemia, progressive fluid retention,  peripheral edema, neuropathy and myalgias. Herceptin was continued as a single agent every 3 weeks. She she completed adjuvant radiation therapy  with moderate toxicities requiring 2 brief treatment breaks. Initiation of hormonal therapy was delayed secondary to significant depression symptoms. She lost her sister and had progressive symptoms following a trial of Lyrica for her neuropathy. Her antidepressant regimen was changed by her primary care physician. Follow-up echocardiogram 17 showed a normal ejection fraction of 55%. She was started on adjuvant hormonal therapy with Femara .  Density from 17 showed osteopenia of the lumbar spine with a T score of -1.7 and osteopenia of both femoral necks with T-scores of -0.9 on the right and -1.2 on the left.  She was initially placed on Actonel which was poorly tolerated.  Once insurance approval is obtained, she was started on Prolia every 6 months.    Patient suffered a fall  resulting in a T12 compression fracture.  She underwent a T12 kyphoplasty by Dr. Feng 11/15/17. Mediport catheter became infected and was removed . Her   suddenly from a massive heart attack .  She was treated with IV Infed 20 for iron deficiency anemia with full recovery of her counts. She had a Cologuard test 10/20/20 which was negative. She was unable to tolerate the prep/NPO status for a colonoscopy.     Annual screening mammogram 22 showed a focal asymmetry in the left breast at 12:00 p.m. She reported a history of trauma to that area several weeks prior to the mammogram that caused visible bruising.  Diagnostic mammogram and left breast ultrasound 22 showed an overall mixed echogenicity mass with indistinct margins at 12:00 p.m..  Biopsy showed organizing hemorrhage and focal fat necrosis.    Previous bone density exam 9/3/21 showed osteopenia of the lumbar spine with a T-score of -1.5, right hip -1.1 and left hip-1.2.  Findings  were stable to improved from her previous exam.      Interval History  Mrs. Castillo is here today by herself for a routine six-month breast cancer surveillance appointment.  She feels well.  She denies any recent illnesses.  At the time of her last visit, she was referred to GI for chronic nausea and constipation.  She underwent an EGD and colonoscopy 06/14/2023.  EGD revealed a benign intrinsic stricture in the GE junction which was dilated.  She also had a small hiatal hernia and patchy erythema within the stomach antrum.  Colonoscopy revealed a polyp in the cecum and ascending colon treated with polypectomy. Follow-up was recommended in 5 years.  She is taking FiberCon twice daily.  Linzess was prescribed but she is unable to tolerate the medication.  Overall she feels her GI symptoms have improved significantly.  She is maintaining her weight.  She continues on endocrine therapy with Femara and will complete 7 years of treatment 4/24.  She denies any changes to her self-breast exam.  Bilateral screening mammogram with isaak 07/05/2023 was unremarkable.  Continued annual screening was recommended.  She is also receiving Prolia for treatment of her bone density.  She underwent a follow-up exam 09/05/2023 with results as follows:   L spine T-score of -1.5   Total L hip T-score of -1.3   Total R hip T-score -1.1  Laboratory for this visit was unremarkable, including LFTs and tumor markers, and reviewed today with the patient.  She does have complaints of cloudy urine.  No fever or back pain.    Review of Systems   Constitutional:  Negative for appetite change and unexpected weight change.   HENT:  Negative for mouth sores.    Eyes:  Positive for visual disturbance.   Respiratory:  Negative for cough and shortness of breath.    Cardiovascular:  Negative for chest pain.   Gastrointestinal:  Positive for constipation and nausea. Negative for abdominal pain and diarrhea.   Genitourinary:  Negative for frequency.         "Cloudy urine   Musculoskeletal:  Negative for back pain.   Integumentary:  Negative for rash.   Neurological:  Positive for headaches.   Hematological:  Negative for adenopathy.   Psychiatric/Behavioral:  The patient is not nervous/anxious.        PMHx:  Diabetes mellitus with LE neuropathy, asthma, hypercholesterolemia, MVP, GERD, anxiety, anemia, migraines, Menarche age 12, 1st pregnancy 15, + OCPs, hysterectomy/BSO 50, no HRT  PSHx:  Right lumpectomy, appendectomy, hysterectomy/BSO, cholecystectomy, right eye surgery age 4, cataracts  SH:  Lifetime nonsmoker, occasional alcohol use.  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    FH:  Her sister had uterine cancer.       Objective:        /69   Pulse 71   Temp 97.9 °F (36.6 °C)   Resp 16   Ht 5' 1" (1.549 m)   Wt 68.4 kg (150 lb 11.2 oz)   SpO2 97%   BMI 28.47 kg/m²    Physical Exam  Constitutional:       General: She is not in acute distress.     Appearance: Normal appearance.   HENT:      Head: Normocephalic.      Nose: Nose normal.      Mouth/Throat:      Mouth: Mucous membranes are moist.      Pharynx: Oropharynx is clear. No posterior oropharyngeal erythema.   Eyes:      Extraocular Movements: Extraocular movements intact.      Conjunctiva/sclera: Conjunctivae normal.      Pupils: Pupils are equal, round, and reactive to light.   Cardiovascular:      Rate and Rhythm: Normal rate and regular rhythm.      Heart sounds: No murmur heard.  Pulmonary:      Breath sounds: Normal breath sounds.   Chest:      Comments: Large breast bilaterally.  Well-healed incisions right UOQ and axilla.  No suspicious masses, skin changes or axillary nodes bilaterally.  Abdominal:      General: Bowel sounds are normal. There is no distension.      Palpations: Abdomen is soft. There is no mass.      Tenderness: There is no abdominal tenderness.   Musculoskeletal:         General: No swelling or tenderness. Normal range of motion.      Cervical back: Neck supple. No " tenderness.   Lymphadenopathy:      Cervical: No cervical adenopathy.   Skin:     General: Skin is warm and dry.      Findings: No rash.   Neurological:      General: No focal deficit present.      Mental Status: She is alert and oriented to person, place, and time.      Cranial Nerves: No cranial nerve deficit.      Gait: Gait normal.       ECOG SCORE    1 - Restricted in strenuous activity-ambulatory and able to carry out work of a light nature          LABORATORY  Recent Results (from the past 336 hour(s))   Cancer Antigen 27-29    Collection Time: 09/05/23  8:50 AM   Result Value Ref Range    Breast Carcinoma Assoc Ag(CA 27.29) <12.0 <=38.0 U/mL   CEA    Collection Time: 09/05/23  8:50 AM   Result Value Ref Range    Carcinoembryonic Antigen 2.89 0.00 - 3.00 ng/mL   Comprehensive Metabolic Panel    Collection Time: 09/05/23  8:50 AM   Result Value Ref Range    Sodium Level 143 136 - 145 mmol/L    Potassium Level 5.4 (H) 3.5 - 5.1 mmol/L    Chloride 107 98 - 107 mmol/L    Carbon Dioxide 25 23 - 31 mmol/L    Glucose Level 63 (L) 82 - 115 mg/dL    Blood Urea Nitrogen 24.9 (H) 9.8 - 20.1 mg/dL    Creatinine 1.19 (H) 0.55 - 1.02 mg/dL    Calcium Level Total 10.0 8.4 - 10.2 mg/dL    Protein Total 7.3 5.8 - 7.6 gm/dL    Albumin Level 4.2 3.4 - 4.8 g/dL    Globulin 3.1 2.4 - 3.5 gm/dL    Albumin/Globulin Ratio 1.4 1.1 - 2.0 ratio    Bilirubin Total 0.6 <=1.5 mg/dL    Alkaline Phosphatase 40 40 - 150 unit/L    Alanine Aminotransferase 17 0 - 55 unit/L    Aspartate Aminotransferase 22 5 - 34 unit/L    eGFR 51 mls/min/1.73/m2   CBC with Differential    Collection Time: 09/05/23  8:50 AM   Result Value Ref Range    WBC 10.21 4.50 - 11.50 x10(3)/mcL    RBC 4.13 (L) 4.20 - 5.40 x10(6)/mcL    Hgb 12.7 12.0 - 16.0 g/dL    Hct 41.4 37.0 - 47.0 %    .2 (H) 80.0 - 94.0 fL    MCH 30.8 27.0 - 31.0 pg    MCHC 30.7 (L) 33.0 - 36.0 g/dL    RDW 13.8 11.5 - 17.0 %    Platelet 175 130 - 400 x10(3)/mcL    MPV 8.8 7.4 - 10.4 fL     Neut % 71.3 %    Lymph % 16.9 %    Mono % 6.2 %    Eos % 5.2 %    Basophil % 0.3 %    Lymph # 1.73 0.6 - 4.6 x10(3)/mcL    Neut # 7.28 2.1 - 9.2 x10(3)/mcL    Mono # 0.63 0.1 - 1.3 x10(3)/mcL    Eos # 0.53 0 - 0.9 x10(3)/mcL    Baso # 0.03 <=0.2 x10(3)/mcL    IG# 0.01 0 - 0.04 x10(3)/mcL    IG% 0.1 %                 Assessment:   Stage II breast cancer - DERICK  Osteopenia  History of iron deficiency anemia  Hyperkalemia    Plan:   Patient has no clinical or laboratory findings worrisome for recurrent breast cancer.    Continue endocrine therapy with Femara.    Current treatment recommendations are for 7-10 years of adjuvant endocrine therapy as tolerated.    Follow-up bone density exam shows stable osteopenia.  Continue treatment with Prolia.    Next mammogram due 7/24.    UA and culture today to evaluate reports of cloudy urine.  I will contact patient with results once available.    Otherwise return to clinic in 6 months for continued breast cancer surveillance.  Outside GI records were reviewed in preparation for the visit and again today with the patient.  All questions answered.    PAKO MIRANDA, FNP-C    Other Physicians  Dr. Marin Diaz

## 2023-09-14 LAB
BACTERIA UR CULT: ABNORMAL
BACTERIA UR CULT: ABNORMAL

## 2023-09-21 PROBLEM — N30.01 ACUTE CYSTITIS WITH HEMATURIA: Status: ACTIVE | Noted: 2023-09-21

## 2023-09-21 PROBLEM — S86.911A: Status: ACTIVE | Noted: 2023-09-21

## 2023-12-05 DIAGNOSIS — C50.411 MALIGNANT NEOPLASM OF UPPER-OUTER QUADRANT OF RIGHT BREAST IN FEMALE, ESTROGEN RECEPTOR POSITIVE: ICD-10-CM

## 2023-12-05 DIAGNOSIS — Z17.0 MALIGNANT NEOPLASM OF UPPER-OUTER QUADRANT OF RIGHT BREAST IN FEMALE, ESTROGEN RECEPTOR POSITIVE: ICD-10-CM

## 2023-12-05 RX ORDER — LETROZOLE 2.5 MG/1
2.5 TABLET, FILM COATED ORAL DAILY
Qty: 90 TABLET | Refills: 1 | Status: SHIPPED | OUTPATIENT
Start: 2023-12-05 | End: 2024-03-22

## 2024-01-29 ENCOUNTER — OFFICE VISIT (OUTPATIENT)
Dept: URGENT CARE | Facility: CLINIC | Age: 64
End: 2024-01-29
Payer: COMMERCIAL

## 2024-01-29 VITALS
SYSTOLIC BLOOD PRESSURE: 105 MMHG | OXYGEN SATURATION: 99 % | RESPIRATION RATE: 18 BRPM | HEART RATE: 94 BPM | DIASTOLIC BLOOD PRESSURE: 72 MMHG | TEMPERATURE: 98 F | BODY MASS INDEX: 25.78 KG/M2 | HEIGHT: 64 IN | WEIGHT: 151 LBS

## 2024-01-29 DIAGNOSIS — E11.9 TYPE 2 DIABETES MELLITUS WITHOUT COMPLICATION, WITHOUT LONG-TERM CURRENT USE OF INSULIN: ICD-10-CM

## 2024-01-29 DIAGNOSIS — M54.50 LUMBAR BACK PAIN: Primary | ICD-10-CM

## 2024-01-29 LAB — GLUCOSE SERPL-MCNC: 78 MG/DL (ref 70–110)

## 2024-01-29 PROCEDURE — 99213 OFFICE O/P EST LOW 20 MIN: CPT | Mod: 25,,,

## 2024-01-29 PROCEDURE — 96372 THER/PROPH/DIAG INJ SC/IM: CPT | Mod: ,,,

## 2024-01-29 PROCEDURE — 82962 GLUCOSE BLOOD TEST: CPT | Mod: ,,,

## 2024-01-29 RX ORDER — DEXAMETHASONE SODIUM PHOSPHATE 100 MG/10ML
8 INJECTION INTRAMUSCULAR; INTRAVENOUS
Status: COMPLETED | OUTPATIENT
Start: 2024-01-29 | End: 2024-01-29

## 2024-01-29 RX ADMIN — DEXAMETHASONE SODIUM PHOSPHATE 8 MG: 100 INJECTION INTRAMUSCULAR; INTRAVENOUS at 10:01

## 2024-01-29 NOTE — PROGRESS NOTES
"Subjective:      Patient ID: Dede Castillo is a 63 y.o. female.    Vitals:  height is 5' 4" (1.626 m) and weight is 68.5 kg (151 lb). Her oral temperature is 97.7 °F (36.5 °C). Her blood pressure is 105/72 and her pulse is 94. Her respiration is 18 and oxygen saturation is 99%.     Chief Complaint: Back Pain     Patient is a 63 y.o. female with past medical history of malignant neoplasm of right breast in remission over the last 8 year, type 2 diabetes, CKD stage 3, hypercholesterolemia, mitral valve prolapse, iron deficiency anemia, osteopenia on Prolia injections, reflux who presents to urgent care with complaints of left sided back pain intermittent x3 months, pain got severe last night. Alleviating factors include advil with no relief. Patient denies any known injury or trauma, patient denies flank pain, urinary symptoms, nausea, vomiting, diarrhea, bowel or bladder dysfunction, numbness, tingling, paresthesias, focal weakness.  Patient denies any radiation of pain.  Patient reports back pain is worse with walking, bending, twisting, range of motion.  Patient would like me to know that 4 days ago during the evening she was sitting in a recliner and felt like the "inside of her head felt like mush" and also complains of feeling as if her body was dead weight.  Patient reports going to sleep on the recliner when the symptoms occurred.  Was awoken by her daughter a few hours later and walked to her bedroom.  Patient denies any visual changes, speech changes, gait instability, headache, focal extremity weakness.  She reports she attempted to call her primary care provider to be seen however they are changing offices and can not see her, she would like to know she should be seen by her neurologist.    ROS   Objective:     Physical Exam   Constitutional: She is oriented to person, place, and time. She appears well-developed. She is cooperative. No distress.   HENT:   Head: Normocephalic and atraumatic.   Nose: Nose " normal.   Mouth/Throat: Oropharynx is clear and moist and mucous membranes are normal.   Eyes: Conjunctivae and lids are normal. Pupils are equal, round, and reactive to light. Extraocular movement intact   Neck: Trachea normal and phonation normal. Neck supple.   Cardiovascular: Normal rate, regular rhythm, normal heart sounds and normal pulses.   Pulmonary/Chest: Effort normal and breath sounds normal.   Abdominal: Normal appearance and bowel sounds are normal. She exhibits no mass. Soft.   Musculoskeletal:         General: No deformity.      Lumbar back: She exhibits tenderness and bony tenderness.      Comments: Pain with range of motion   Neurological: no focal deficit. She is alert, oriented to person, place, and time and at baseline. She has normal strength and normal reflexes. She displays no weakness, facial symmetry and no dysarthria. No sensory deficit. She shows no pronator drift. Gait normal. GCS eye subscore is 4. GCS verbal subscore is 5. GCS motor subscore is 6.   Skin: Skin is warm, dry, intact and not diaphoretic.   Psychiatric: Her speech is normal and behavior is normal. Judgment and thought content normal.   Nursing note and vitals reviewed.  X-ray lumbar:  No acute radiographic findings identified per radiology    Assessment:     1. Lumbar back pain    2. Type 2 diabetes mellitus without complication, without long-term current use of insulin        Plan:       Lumbar back pain  -     X-Ray Lumbar Spine 2 Or 3 Views; Future; Expected date: 01/29/2024  -     dexAMETHasone injection 8 mg    Type 2 diabetes mellitus without complication, without long-term current use of insulin  -     POCT Glucose, Hand-Held Device    Based on patient's history of cancer and osteopenia discussed x-ray in clinic for back pain.  X-ray shows thoracic postop changes, mild degenerative changes, no acute abnormality.  Patient is a diabetic however last A1c 6.2.  CBG 78.  She is on Synjardy, once daily Tresiba insulin,  [facility]  Patient has history of osteopenia on Prolia injections, has not had steroids in a very long time.  Discussed possible steroid injection for back pain.  Will avoid anti-inflammatories due to patient's history of CKD stage 3.      Based on patient's description of symptoms from Thursday discussed she should presents to the ER for further workup.  Discussed possible TIA or other etiology.  Patient denies symptoms adamantly at present.  She would like steroid injection for back pain and will presents to the ER for further workup of her other symptoms.    Will call with official x-ray results once received by Radiology.    As discussed, it is recommended that you present to the ER now for further evaluation to prevent a delay in care.   Offered transport via EMS but patient/family declines despite informed risks including death.  Opts for private transport via personal vehicle.

## 2024-01-29 NOTE — PATIENT INSTRUCTIONS
X-ray negative for acute abnormality per Radiology  Monitor diet and blood sugar over the next few days due to elevated blood sugar levels from steroid injection.      As discussed, it is recommended that you present to the ER now for further evaluation from your symptoms on Thursday, to prevent a delay in care including death.

## 2024-02-14 PROBLEM — R51.9 ACUTE NONINTRACTABLE HEADACHE: Status: ACTIVE | Noted: 2024-02-14

## 2024-02-14 PROBLEM — M54.50 ACUTE MIDLINE LOW BACK PAIN WITHOUT SCIATICA: Status: ACTIVE | Noted: 2024-02-14

## 2024-02-14 PROBLEM — R53.1 WEAKNESS: Status: ACTIVE | Noted: 2024-02-14

## 2024-02-14 PROBLEM — Z09 HOSPITAL DISCHARGE FOLLOW-UP: Status: ACTIVE | Noted: 2024-02-14

## 2024-02-14 NOTE — PROGRESS NOTES
".Follow-up (Follow up ER visit)         HPI:    Patient presents for hospital discharge follow-up.  Patient presented to urgent care 01/29/2024 with low back pain and sciatica.  She told them that 4 days prior to presentation she had diffuse weakness and a bad headache. She   They sent her to ER for further evaluation and to rule out a TIA.  Workup in ER unremarkable.  She was prescribed flexeril, zofran, prednisone and tramadol.   Her back pain is still present but improved. She denies any radicular pain. She fell on 2/10/2024; she hurt her right foot and ankle.  She is able to weightbear with discomfort.  She tripped on the foot of a barricade.    She has mild headaches. She takes Advil infrequently. Her bad headache has resolved. She gets dizzy at times. No lightheadedness.    Current Outpatient Medications:     atorvastatin (LIPITOR) 80 MG tablet, Take 1 tablet (80 mg total) by mouth once daily., Disp: 90 tablet, Rfl: 3    BD DARÍO 2ND GEN PEN NEEDLE 32 gauge x 5/32" Ndle, use as directed, Disp: , Rfl:     cetirizine (ZYRTEC) 10 MG tablet, Take 10 mg by mouth Daily., Disp: , Rfl:     DULoxetine (CYMBALTA) 60 MG capsule, Take 1 capsule (60 mg total) by mouth once daily., Disp: 90 capsule, Rfl: 3    ergocalciferol (VITAMIN D2) 50,000 unit Cap, Take 50,000 Units by mouth every 7 days., Disp: , Rfl:     fluticasone propionate (FLONASE) 50 mcg/actuation nasal spray, 1 spray by Each Nostril route 2 (two) times daily as needed., Disp: , Rfl:     gabapentin (NEURONTIN) 300 MG capsule, Take 1 capsule (300 mg total) by mouth 2 (two) times daily., Disp: 180 capsule, Rfl: 3    insulin degludec (TRESIBA FLEXTOUCH U-200) 200 unit/mL (3 mL) insulin pen, INJECT UNDER THE SKIN BETWEEN 60-80 UNITS PER SLIDING SCALE (OR 70 UNITS) ONCE DAILY AS DIRECTED, Disp: 27 pen, Rfl: 1    letrozole (FEMARA) 2.5 mg Tab, Take 1 tablet (2.5 mg total) by mouth once daily., Disp: 90 tablet, Rfl: 1    magnesium oxide (MAG-OX) 400 mg (241.3 mg " magnesium) tablet, Take 400 mg by mouth once daily., Disp: , Rfl:     ONETOUCH VERIO TEST STRIPS Strp, 2 (two) times daily. Test, Disp: , Rfl:     pantoprazole (PROTONIX) 40 MG tablet, Take 1 tablet (40 mg total) by mouth once daily., Disp: 90 tablet, Rfl: 3    polycarbophil (FIBERCON) 625 mg tablet, Take 2 tablets by mouth 2 (two) times a day., Disp: , Rfl:     SYNJARDY XR 12.5-1,000 mg TBph, Take 1 tablet by mouth 2 (two) times daily., Disp: 180 tablet, Rfl: 3    topiramate (TOPAMAX) 50 MG tablet, Take 1 tablet (50 mg total) by mouth once daily. At bedtime, Disp: 90 tablet, Rfl: 3    dulaglutide (TRULICITY) 0.75 mg/0.5 mL pen injector, Inject 0.75 mg into the skin every 7 days., Disp: 12 pen , Rfl: 1      ROS:    See HPI.      PE:    ..  Vitals:    02/15/24 1355   BP: 95/63   Pulse: 103   Temp: 97.8 °F (36.6 °C)        General:  She is well-developed well-nourished white female in no apparent distress.  She is alert and oriented.  She transfers without difficulty.  She has a normal gait.    Chest: Clear to auscultation bilaterally.    CV: Regular rate rhythm without murmurs rubs gallops.  Right foot:  She is diffusely tender over right 1st metatarsal.  No erythema, swelling or bruising noted.  No palpable deformities.  Right ankle:  She has bruising over dorsal angle and inferior to lateral malleolus/lateral hindfoot.  She has increased discomfort with plantar flexion and inversion.        1. Hospital discharge follow-up  Overview:  Patient presents for hospital discharge follow-up.  ER records reviewed.    Workup in ER negative for TIA.  Back pain is improved; still present.  Patient advised to contact us should any symptoms recur.  ER precautions given.      2. Weakness  Overview:  Patient feels back to her normal state of health except for right ankle and foot pain.      3. Acute nonintractable headache, unspecified headache type  Overview:  Patient states her bad headaches have resolved.    She has mild  headaches at times for which she takes ibuprofen.    Orders:  -     Ambulatory referral/consult to Neurology; Future; Expected date: 02/22/2024    4. Acute midline low back pain without sciatica  Overview:  Patient states her back pain is doing better; it is still present but improved.  Continue light activity.    Heat/massage.    Call if symptoms persist or worsen.      5. Injury of right ankle, initial encounter  Overview:  Patient fell on 02/10/2024 injuring her right ankle.  She is diffusely tender over lateral malleolus area.  She she is bruising over her dorsal ankle as well as lateral hindfoot region.  X-rays pending.  RICE.    Orders:  -     X-Ray Ankle Complete Right; Future; Expected date: 02/15/2024    6. Injury of right foot, initial encounter  Overview:  Patient fell on 02/10/2024 hurting her right foot.  Exam reveals marked tenderness over 1st metatarsal.  X-rays pending.    Orders:  -     X-Ray Foot Complete Right; Future; Expected date: 02/15/2024    7. Type 2 diabetes mellitus without complication, with long-term current use of insulin  -     dulaglutide (TRULICITY) 0.75 mg/0.5 mL pen injector; Inject 0.75 mg into the skin every 7 days.  Dispense: 12 pen ; Refill: 1    8. Type 2 diabetes mellitus with diabetic polyneuropathy, with long-term current use of insulin  Overview:  A1c, microalbumin and lipid profile pending.    Foot exam performed today; neuropathy noted.    Last eye exam 2022.  Continue ADA diet; limit intake of sugary foods and refined carbohydrates.  Continue Trulicity, SynJardy and Tresiba.    09/21/2023:  Labs 03/2023: A1c 6.4.  Microalbumin 30 milligrams/liter.  Foot exam performed today.  Continue ADA diet; limit intake of sugary foods and refined carbohydrates.    02/16/2024:  Refills for Trulicity sent.  Labs 09/2023:  A1c 6.2.  03/2023: Microalbumin 30 milligrams/liter.  Patient advised to have eyes examined.       A total of 45 minutes was spent reviewing patient's chart,  seeing patient and documentation.        ..No follow-ups on file.

## 2024-02-15 ENCOUNTER — OFFICE VISIT (OUTPATIENT)
Dept: PRIMARY CARE CLINIC | Facility: CLINIC | Age: 64
End: 2024-02-15
Payer: COMMERCIAL

## 2024-02-15 VITALS
OXYGEN SATURATION: 99 % | HEIGHT: 65 IN | WEIGHT: 155.31 LBS | HEART RATE: 103 BPM | DIASTOLIC BLOOD PRESSURE: 63 MMHG | TEMPERATURE: 98 F | BODY MASS INDEX: 25.88 KG/M2 | SYSTOLIC BLOOD PRESSURE: 95 MMHG

## 2024-02-15 DIAGNOSIS — E11.42 TYPE 2 DIABETES MELLITUS WITH DIABETIC POLYNEUROPATHY, WITH LONG-TERM CURRENT USE OF INSULIN: ICD-10-CM

## 2024-02-15 DIAGNOSIS — E11.9 TYPE 2 DIABETES MELLITUS WITHOUT COMPLICATION, WITH LONG-TERM CURRENT USE OF INSULIN: ICD-10-CM

## 2024-02-15 DIAGNOSIS — M54.50 ACUTE MIDLINE LOW BACK PAIN WITHOUT SCIATICA: ICD-10-CM

## 2024-02-15 DIAGNOSIS — R51.9 ACUTE NONINTRACTABLE HEADACHE, UNSPECIFIED HEADACHE TYPE: ICD-10-CM

## 2024-02-15 DIAGNOSIS — Z79.4 TYPE 2 DIABETES MELLITUS WITH DIABETIC POLYNEUROPATHY, WITH LONG-TERM CURRENT USE OF INSULIN: ICD-10-CM

## 2024-02-15 DIAGNOSIS — Z79.4 TYPE 2 DIABETES MELLITUS WITHOUT COMPLICATION, WITH LONG-TERM CURRENT USE OF INSULIN: ICD-10-CM

## 2024-02-15 DIAGNOSIS — Z09 HOSPITAL DISCHARGE FOLLOW-UP: Primary | ICD-10-CM

## 2024-02-15 DIAGNOSIS — R53.1 WEAKNESS: ICD-10-CM

## 2024-02-15 DIAGNOSIS — S99.921A INJURY OF RIGHT FOOT, INITIAL ENCOUNTER: ICD-10-CM

## 2024-02-15 DIAGNOSIS — S99.911A INJURY OF RIGHT ANKLE, INITIAL ENCOUNTER: ICD-10-CM

## 2024-02-15 PROCEDURE — 99215 OFFICE O/P EST HI 40 MIN: CPT | Mod: ,,, | Performed by: FAMILY MEDICINE

## 2024-02-15 PROCEDURE — 1160F RVW MEDS BY RX/DR IN RCRD: CPT | Mod: CPTII,,, | Performed by: FAMILY MEDICINE

## 2024-02-15 PROCEDURE — 3008F BODY MASS INDEX DOCD: CPT | Mod: CPTII,,, | Performed by: FAMILY MEDICINE

## 2024-02-15 PROCEDURE — 3078F DIAST BP <80 MM HG: CPT | Mod: CPTII,,, | Performed by: FAMILY MEDICINE

## 2024-02-15 PROCEDURE — 3074F SYST BP LT 130 MM HG: CPT | Mod: CPTII,,, | Performed by: FAMILY MEDICINE

## 2024-02-15 PROCEDURE — 1159F MED LIST DOCD IN RCRD: CPT | Mod: CPTII,,, | Performed by: FAMILY MEDICINE

## 2024-02-15 RX ORDER — LANOLIN ALCOHOL/MO/W.PET/CERES
400 CREAM (GRAM) TOPICAL DAILY
COMMUNITY

## 2024-02-15 RX ORDER — DULAGLUTIDE 0.75 MG/.5ML
0.75 INJECTION, SOLUTION SUBCUTANEOUS
Qty: 12 PEN | Refills: 1 | Status: SHIPPED | OUTPATIENT
Start: 2024-02-15 | End: 2024-03-22 | Stop reason: SDUPTHER

## 2024-02-16 ENCOUNTER — TELEPHONE (OUTPATIENT)
Dept: PRIMARY CARE CLINIC | Facility: CLINIC | Age: 64
End: 2024-02-16
Payer: COMMERCIAL

## 2024-02-16 PROBLEM — Z79.4 TYPE 2 DIABETES MELLITUS WITHOUT COMPLICATION, WITH LONG-TERM CURRENT USE OF INSULIN: Status: ACTIVE | Noted: 2024-02-16

## 2024-02-16 PROBLEM — S99.921A INJURY OF RIGHT FOOT: Status: ACTIVE | Noted: 2024-02-16

## 2024-02-16 PROBLEM — E11.9 TYPE 2 DIABETES MELLITUS WITHOUT COMPLICATION, WITH LONG-TERM CURRENT USE OF INSULIN: Status: ACTIVE | Noted: 2024-02-16

## 2024-02-16 PROBLEM — S99.911A INJURY OF RIGHT ANKLE: Status: ACTIVE | Noted: 2024-02-16

## 2024-02-16 NOTE — TELEPHONE ENCOUNTER
Left voice message concerning possible fracture to right foot. Instructed to go to [facility] urgent care and follow up with ortho. Instructed to call back with any concerns or questions.

## 2024-02-16 NOTE — TELEPHONE ENCOUNTER
----- Message from Marin Mccoy MD sent at 2/16/2024  8:11 AM CST -----  X-rays of right ankle are unremarkable.  X-rays of right foot reveal a questionable nondisplaced fracture of the base of the 1st proximal phalanx.  Advise patient to go to [facility] urgent care to get a walking shoe and to be set up for follow-up with an o  rtho.

## 2024-02-26 ENCOUNTER — INFUSION (OUTPATIENT)
Dept: INFUSION THERAPY | Facility: HOSPITAL | Age: 64
End: 2024-02-26
Attending: FAMILY MEDICINE
Payer: COMMERCIAL

## 2024-02-26 ENCOUNTER — TELEPHONE (OUTPATIENT)
Dept: PRIMARY CARE CLINIC | Facility: CLINIC | Age: 64
End: 2024-02-26
Payer: COMMERCIAL

## 2024-02-26 VITALS
HEIGHT: 65 IN | DIASTOLIC BLOOD PRESSURE: 70 MMHG | BODY MASS INDEX: 26.33 KG/M2 | SYSTOLIC BLOOD PRESSURE: 108 MMHG | HEART RATE: 87 BPM | WEIGHT: 158 LBS | OXYGEN SATURATION: 98 % | RESPIRATION RATE: 20 BRPM | TEMPERATURE: 98 F

## 2024-02-26 DIAGNOSIS — M85.89 OSTEOPENIA OF MULTIPLE SITES: Primary | ICD-10-CM

## 2024-02-26 PROCEDURE — 96372 THER/PROPH/DIAG INJ SC/IM: CPT

## 2024-02-26 PROCEDURE — 63600175 PHARM REV CODE 636 W HCPCS: Mod: JZ,JG | Performed by: NURSE PRACTITIONER

## 2024-02-26 RX ADMIN — DENOSUMAB 60 MG: 60 INJECTION SUBCUTANEOUS at 09:02

## 2024-02-26 NOTE — TELEPHONE ENCOUNTER
Spoke with pt concerning back pain. Instructed that Dr Mccoy not in office and pt should  go to urgent care. Pt states took pain medication prescribed previously and relieved pain. Instructed pt if pain persists, to call office back for next available appt or go to urgent care. Pt verbalized understanding.

## 2024-02-26 NOTE — TELEPHONE ENCOUNTER
----- Message from Xochitl Black sent at 2/26/2024 10:07 AM CST -----  Regarding: medical advice  Type:  Needs Medical Advice    Who Called:  pt    Symptoms (please be specific):  deep pain right side of middle back     How long has patient had these symptoms:   week    Pharmacy name and phone #:   [email or web address]    Would the patient rather a call back or a response via [facility]?  Yes    Best Call Back Number:  [phone]    Additional Information:  pt called in regards to symptoms listed, please advise, thanks

## 2024-03-06 NOTE — PROGRESS NOTES
Subjective:       Patient ID: Dede Castillo is a 63 y.o. female.    Chief Complaint:  I'm doing okay    Diagnosis: Stage IIA right breast cancer 6/16 (T2 N0 M0), 2.1 cm, grade II, ER/NE +, HER-2 +                    Postmenopausal s/p hysterectomy/BSO                    Iron deficiency anemia    Current Treatment: Letrozole 2.5 mg (started 4/17)    Treatment History  TCH + Perjeta x 1 -->THP x 1 --> TCH x 3 ---> XRT ---> Herceptin x 1 yr (7/17)  IV Infed 7/22/20    Clinical History: Patient presented with an abnormal screening mammogram 6/1/16 showing a suspicious mass in the upper outer quadrant of the right breast.  Lesion measured 2.9 x 1.3 cm by ultrasound.  Ultrasound-guided biopsy was positive for invasive carcinoma with ductal and lobular features.  She underwent a right lumpectomy and sentinel lymph node dissection 6/23/16.  Final pathology revealed a 2.1 cm moderately differentiated infiltrating ductal carcinoma.  4 sentinel lymph nodes were negative for involvement.  ER +77%, NE +51% and HER-2 positive for overexpression by IHC and FISH.  Ki-67 expression was 13% (borderline).  There was no family history of breast cancer.  She was seen for a Medical Oncology opinion 7/21/16.  Tratment was recommended with a regimen of TCH + Perjeta for 6 cycles followed by single agent Herceptin every 3 weeks to complete one year of therapy.  She underwent placement of a left chest wall Mediport for faciliation of chemotherapy 8/5/16.  Baseline echocardiogram 8/1/16 showed normal LV size and function with an EF of 55%.  Chemotherapy was initiated 8/5/16.    She had significant toxicities following her 1st cycle of treatment requiring dose adjustment in treatment modifications.  She completed 5 cycles of adjuvant chemotherapy with Taxotere. Treatment was discontinued at that time secondary to progressive toxicities including severe fatigue, anemia, progressive fluid retention, peripheral edema, neuropathy and myalgias.  Herceptin was continued as a single agent every 3 weeks. She she completed adjuvant radiation therapy  with moderate toxicities requiring 2 brief treatment breaks. Initiation of hormonal therapy was delayed secondary to significant depression symptoms. She lost her sister and had progressive symptoms following a trial of Lyrica for her neuropathy. Her antidepressant regimen was changed by her primary care physician. Follow-up echocardiogram 17 showed a normal ejection fraction of 55%. She was started on adjuvant hormonal therapy with Letrozole .  Bone density from 17 showed osteopenia of the lumbar spine with a T score of -1.7 and osteopenia of both femoral necks with T-scores of -0.9 on the right and -1.2 on the left.  She was initially placed on Actonel which was poorly tolerated.  Once insurance approval is obtained, she was started on Prolia every 6 months.    Patient suffered a fall  resulting in a T12 compression fracture.  She underwent a T12 kyphoplasty by Dr. Feng 11/15/17. Mediport catheter became infected and was removed . Her   suddenly from a massive heart attack .  She was treated with IV Infed 20 for iron deficiency anemia with full recovery of her counts. She had a Cologuard test 10/20/20 which was negative. She was unable to tolerate the prep/NPO status for a colonoscopy.     Annual screening mammogram 22 showed a focal asymmetry in the left breast at 12:00 p.m. She reported a history of trauma to that area several weeks prior to the mammogram that caused visible bruising.  Diagnostic mammogram and left breast ultrasound 22 showed an overall mixed echogenicity mass with indistinct margins at 12:00 p.m..  Biopsy showed organizing hemorrhage and focal fat necrosis.    Bone density exam 9/3/21:  Osteopenia of the lumbar spine with a T-score of -1.5, right hip -1.1 and left hip-1.2.  Findings were stable to improved from her previous exam.     EGD 6/14/23 showed a benign intrinsic stricture at the GE junction which was dilated.  Colonoscopy showed polyps in the cecum and ascending colon which were removed.  Follow-up exam was recommended in 5 years.    Interval History  She returns to the office today by herself for a six-month surveillance visit.  She has now completed 7 years of adjuvant hormonal therapy with letrozole.  She continues to tolerate treatment well.  She presented to the [facility]24 with headaches and difficulty concentrating.  She was diagnosed with a possible TIA.  CT of the head showed no acute abnormalities.  CTA of the head and neck showed no significant stenosis or occlusions.  Outpatient telemedicine follow-up was recommended with a neurologist in [place].  She canceled the appointment.  She has not had any recurrent symptoms.  She has no signs or symptoms suspicious for recurrence of her breast cancer.  She reports no changes on her self-breast examination.  Bilateral screening mammogram 7/5/23 showed stable post treatment changes on the right with no new or suspicious findings.  She remains on Prolia every 6 months for her bone density.  Bone density exam 9/5/23 showed stable osteopenia of the lumbar spine with a T-score of -1.5 and both hips with a T-score of -1.3 on the left and -1.1 on the right.  Laboratory testing for this visit showed no significant abnormalities.      Review of Systems   Constitutional:  Negative for appetite change, fatigue, fever and unexpected weight change.   HENT:  Negative for mouth sores, sore throat and trouble swallowing.    Eyes: Negative.    Respiratory:  Negative for cough and shortness of breath.    Cardiovascular:  Negative for chest pain, palpitations and leg swelling.   Gastrointestinal:  Positive for constipation. Negative for abdominal distention, abdominal pain, blood in stool, change in bowel habit, diarrhea, nausea and vomiting.   Genitourinary:  Negative for dysuria, frequency  "and urgency.   Musculoskeletal:  Negative for arthralgias and back pain.   Integumentary:  Negative for pallor, rash, breast mass and breast tenderness.   Neurological:  Positive for headaches (mild, occasional). Negative for dizziness, weakness and numbness.   Hematological:  Negative for adenopathy. Does not bruise/bleed easily.   Psychiatric/Behavioral: Negative.     Breast: Negative for mass and tenderness      PMHx:  Diabetes mellitus with LE neuropathy, asthma, hypercholesterolemia, MVP, GERD, anxiety, anemia, migraines, Menarche age 12, 1st pregnancy 15, + OCPs, hysterectomy/BSO 50, no HRT  PSHx:  Right lumpectomy, appendectomy, hysterectomy/BSO, cholecystectomy, right eye surgery age 4, cataracts  SH:  Lifetime nonsmoker, occasional alcohol use.  Lives in [place] with her daughter.    FH:  Her sister had uterine cancer.     Objective:        /74   Pulse 96   Temp 97.9 °F (36.6 °C)   Resp 17   Ht 5' 4" (1.626 m)   Wt 71.5 kg (157 lb 11.2 oz)   SpO2 97%   BMI 27.07 kg/m²    Physical Exam  Constitutional:       Comments: Well-developed white female in NAD   HENT:      Head: Normocephalic.      Mouth/Throat:      Mouth: Mucous membranes are moist.      Pharynx: Oropharynx is clear. No posterior oropharyngeal erythema.   Eyes:      Extraocular Movements: Extraocular movements intact.      Conjunctiva/sclera: Conjunctivae normal.      Pupils: Pupils are equal, round, and reactive to light.   Cardiovascular:      Rate and Rhythm: Normal rate and regular rhythm.      Heart sounds: No murmur heard.  Pulmonary:      Comments: Lungs clear to auscultation  Chest:      Comments: Large breast bilaterally.  Well-healed incisions right breast UOQ and axilla.  No suspicious masses, skin changes or axillary nodes bilaterally.  Abdominal:      General: Bowel sounds are normal. There is no distension.      Palpations: Abdomen is soft. There is no mass.      Tenderness: There is no abdominal tenderness. "   Musculoskeletal:         General: No swelling or tenderness. Normal range of motion.      Cervical back: Neck supple. No tenderness.   Lymphadenopathy:      Cervical: No cervical adenopathy.      Upper Body:      Right upper body: No supraclavicular or axillary adenopathy.      Left upper body: No supraclavicular or axillary adenopathy.   Skin:     General: Skin is warm and dry.      Findings: No rash.   Neurological:      General: No focal deficit present.      Mental Status: She is alert and oriented to person, place, and time.      Cranial Nerves: No cranial nerve deficit.      Motor: No weakness.       ECOG SCORE    0 - Fully active-able to carry on all pre-disease performance without restriction          LABORATORY  Recent Results (from the past 336 hour(s))   Comprehensive Metabolic Panel    Collection Time: 03/08/24  9:24 AM   Result Value Ref Range    Sodium Level 142 136 - 145 mmol/L    Potassium Level 4.6 3.5 - 5.1 mmol/L    Chloride 104 98 - 107 mmol/L    Carbon Dioxide 27 23 - 31 mmol/L    Glucose Level 78 (L) 82 - 115 mg/dL    Blood Urea Nitrogen 31.9 (H) 9.8 - 20.1 mg/dL    Creatinine 1.34 (H) 0.55 - 1.02 mg/dL    Calcium Level Total 9.3 8.4 - 10.2 mg/dL    Protein Total 6.8 5.8 - 7.6 gm/dL    Albumin Level 3.7 3.4 - 4.8 g/dL    Globulin 3.1 2.4 - 3.5 gm/dL    Albumin/Globulin Ratio 1.2 1.1 - 2.0 ratio    Bilirubin Total 0.5 <=1.5 mg/dL    Alkaline Phosphatase 70 40 - 150 unit/L    Alanine Aminotransferase 15 0 - 55 unit/L    Aspartate Aminotransferase 16 5 - 34 unit/L    eGFR 44 mls/min/1.73/m2   CEA    Collection Time: 03/08/24  9:24 AM   Result Value Ref Range    Carcinoembryonic Antigen 2.93 0.00 - 3.00 ng/mL   CBC with Differential    Collection Time: 03/08/24  9:24 AM   Result Value Ref Range    WBC 8.10 4.50 - 11.50 x10(3)/mcL    RBC 3.92 (L) 4.20 - 5.40 x10(6)/mcL    Hgb 12.2 12.0 - 16.0 g/dL    Hct 38.9 37.0 - 47.0 %    MCV 99.2 (H) 80.0 - 94.0 fL    MCH 31.1 (H) 27.0 - 31.0 pg    MCHC 31.4  (L) 33.0 - 36.0 g/dL    RDW 14.4 11.5 - 17.0 %    Platelet 169 130 - 400 x10(3)/mcL    MPV 8.4 7.4 - 10.4 fL    Neut % 70.3 %    Lymph % 18.8 %    Mono % 6.5 %    Eos % 3.7 %    Basophil % 0.5 %    Lymph # 1.52 0.6 - 4.6 x10(3)/mcL    Neut # 5.69 2.1 - 9.2 x10(3)/mcL    Mono # 0.53 0.1 - 1.3 x10(3)/mcL    Eos # 0.30 0 - 0.9 x10(3)/mcL    Baso # 0.04 <=0.2 x10(3)/mcL    IG# 0.02 0 - 0.04 x10(3)/mcL    IG% 0.2 %         Assessment:   Stage II breast cancer - DERICK  Osteopenia  History of iron deficiency anemia    Plan:   Patient continues to do well with no clinical or radiographic evidence of disease recurrence.  She has completed 7 years of adjuvant hormonal therapy with letrozole and treatment will be discontinued.  She has no suspicious findings on her clinical breast exam.   Schedule bilateral screening mammogram in July.  [facility] after the mammogram for a follow-up visit and clinical exam.  If she continues to do well at that time, her surveillance appointments will be changed to yearly.      PAMELA CORDON MD    Other Physicians  Dr. Marin Diaz

## 2024-03-08 ENCOUNTER — LAB VISIT (OUTPATIENT)
Dept: LAB | Facility: HOSPITAL | Age: 64
End: 2024-03-08
Attending: NURSE PRACTITIONER
Payer: COMMERCIAL

## 2024-03-08 DIAGNOSIS — N18.31 CHRONIC KIDNEY DISEASE, STAGE 3A: ICD-10-CM

## 2024-03-08 DIAGNOSIS — C50.411 MALIGNANT NEOPLASM OF UPPER-OUTER QUADRANT OF RIGHT BREAST IN FEMALE, ESTROGEN RECEPTOR POSITIVE: ICD-10-CM

## 2024-03-08 DIAGNOSIS — Z17.0 MALIGNANT NEOPLASM OF UPPER-OUTER QUADRANT OF RIGHT BREAST IN FEMALE, ESTROGEN RECEPTOR POSITIVE: ICD-10-CM

## 2024-03-08 LAB
ALBUMIN SERPL-MCNC: 3.7 G/DL (ref 3.4–4.8)
ALBUMIN/GLOB SERPL: 1.2 RATIO (ref 1.1–2)
ALP SERPL-CCNC: 70 UNIT/L (ref 40–150)
ALT SERPL-CCNC: 15 UNIT/L (ref 0–55)
AST SERPL-CCNC: 16 UNIT/L (ref 5–34)
BASOPHILS # BLD AUTO: 0.04 X10(3)/MCL
BASOPHILS NFR BLD AUTO: 0.5 %
BILIRUB SERPL-MCNC: 0.5 MG/DL
BUN SERPL-MCNC: 31.9 MG/DL (ref 9.8–20.1)
CALCIUM SERPL-MCNC: 9.3 MG/DL (ref 8.4–10.2)
CEA SERPL-MCNC: 2.93 NG/ML (ref 0–3)
CHLORIDE SERPL-SCNC: 104 MMOL/L (ref 98–107)
CO2 SERPL-SCNC: 27 MMOL/L (ref 23–31)
CREAT SERPL-MCNC: 1.34 MG/DL (ref 0.55–1.02)
EOSINOPHIL # BLD AUTO: 0.3 X10(3)/MCL (ref 0–0.9)
EOSINOPHIL NFR BLD AUTO: 3.7 %
ERYTHROCYTE [DISTWIDTH] IN BLOOD BY AUTOMATED COUNT: 14.4 % (ref 11.5–17)
GFR SERPLBLD CREATININE-BSD FMLA CKD-EPI: 44 MLS/MIN/1.73/M2
GLOBULIN SER-MCNC: 3.1 GM/DL (ref 2.4–3.5)
GLUCOSE SERPL-MCNC: 78 MG/DL (ref 82–115)
HCT VFR BLD AUTO: 38.9 % (ref 37–47)
HGB BLD-MCNC: 12.2 G/DL (ref 12–16)
IMM GRANULOCYTES # BLD AUTO: 0.02 X10(3)/MCL (ref 0–0.04)
IMM GRANULOCYTES NFR BLD AUTO: 0.2 %
LYMPHOCYTES # BLD AUTO: 1.52 X10(3)/MCL (ref 0.6–4.6)
LYMPHOCYTES NFR BLD AUTO: 18.8 %
MCH RBC QN AUTO: 31.1 PG (ref 27–31)
MCHC RBC AUTO-ENTMCNC: 31.4 G/DL (ref 33–36)
MCV RBC AUTO: 99.2 FL (ref 80–94)
MONOCYTES # BLD AUTO: 0.53 X10(3)/MCL (ref 0.1–1.3)
MONOCYTES NFR BLD AUTO: 6.5 %
NEUTROPHILS # BLD AUTO: 5.69 X10(3)/MCL (ref 2.1–9.2)
NEUTROPHILS NFR BLD AUTO: 70.3 %
PLATELET # BLD AUTO: 169 X10(3)/MCL (ref 130–400)
PMV BLD AUTO: 8.4 FL (ref 7.4–10.4)
POTASSIUM SERPL-SCNC: 4.6 MMOL/L (ref 3.5–5.1)
PROT SERPL-MCNC: 6.8 GM/DL (ref 5.8–7.6)
RBC # BLD AUTO: 3.92 X10(6)/MCL (ref 4.2–5.4)
SODIUM SERPL-SCNC: 142 MMOL/L (ref 136–145)
WBC # SPEC AUTO: 8.1 X10(3)/MCL (ref 4.5–11.5)

## 2024-03-08 PROCEDURE — 80053 COMPREHEN METABOLIC PANEL: CPT

## 2024-03-08 PROCEDURE — 85025 COMPLETE CBC W/AUTO DIFF WBC: CPT

## 2024-03-08 PROCEDURE — 86300 IMMUNOASSAY TUMOR CA 15-3: CPT

## 2024-03-08 PROCEDURE — 82378 CARCINOEMBRYONIC ANTIGEN: CPT

## 2024-03-08 PROCEDURE — 36415 COLL VENOUS BLD VENIPUNCTURE: CPT

## 2024-03-11 ENCOUNTER — OFFICE VISIT (OUTPATIENT)
Dept: HEMATOLOGY/ONCOLOGY | Facility: CLINIC | Age: 64
End: 2024-03-11
Payer: COMMERCIAL

## 2024-03-11 VITALS
RESPIRATION RATE: 17 BRPM | DIASTOLIC BLOOD PRESSURE: 74 MMHG | BODY MASS INDEX: 26.92 KG/M2 | SYSTOLIC BLOOD PRESSURE: 101 MMHG | OXYGEN SATURATION: 97 % | TEMPERATURE: 98 F | HEIGHT: 64 IN | WEIGHT: 157.69 LBS | HEART RATE: 96 BPM

## 2024-03-11 DIAGNOSIS — C50.411 MALIGNANT NEOPLASM OF UPPER-OUTER QUADRANT OF RIGHT BREAST IN FEMALE, ESTROGEN RECEPTOR POSITIVE: Primary | ICD-10-CM

## 2024-03-11 DIAGNOSIS — Z17.0 MALIGNANT NEOPLASM OF UPPER-OUTER QUADRANT OF RIGHT BREAST IN FEMALE, ESTROGEN RECEPTOR POSITIVE: Primary | ICD-10-CM

## 2024-03-11 DIAGNOSIS — Z12.31 SCREENING MAMMOGRAM FOR BREAST CANCER: ICD-10-CM

## 2024-03-11 LAB — CANCER AG27-29 SERPL-ACNC: <12 U/ML

## 2024-03-11 PROCEDURE — 3078F DIAST BP <80 MM HG: CPT | Mod: CPTII,S$GLB,, | Performed by: INTERNAL MEDICINE

## 2024-03-11 PROCEDURE — 1160F RVW MEDS BY RX/DR IN RCRD: CPT | Mod: CPTII,S$GLB,, | Performed by: INTERNAL MEDICINE

## 2024-03-11 PROCEDURE — 3074F SYST BP LT 130 MM HG: CPT | Mod: CPTII,S$GLB,, | Performed by: INTERNAL MEDICINE

## 2024-03-11 PROCEDURE — 3008F BODY MASS INDEX DOCD: CPT | Mod: CPTII,S$GLB,, | Performed by: INTERNAL MEDICINE

## 2024-03-11 PROCEDURE — 99999 PR PBB SHADOW E&M-EST. PATIENT-LVL V: CPT | Mod: PBBFAC,,, | Performed by: INTERNAL MEDICINE

## 2024-03-11 PROCEDURE — 99214 OFFICE O/P EST MOD 30 MIN: CPT | Mod: S$GLB,,, | Performed by: INTERNAL MEDICINE

## 2024-03-11 PROCEDURE — 1159F MED LIST DOCD IN RCRD: CPT | Mod: CPTII,S$GLB,, | Performed by: INTERNAL MEDICINE

## 2024-03-11 RX ORDER — ONDANSETRON 4 MG/1
4 TABLET, ORALLY DISINTEGRATING ORAL EVERY 6 HOURS PRN
COMMUNITY
Start: 2024-01-31

## 2024-03-14 ENCOUNTER — TELEPHONE (OUTPATIENT)
Dept: PRIMARY CARE CLINIC | Facility: CLINIC | Age: 64
End: 2024-03-14
Payer: COMMERCIAL

## 2024-03-14 DIAGNOSIS — R53.1 WEAKNESS: Primary | ICD-10-CM

## 2024-03-14 NOTE — TELEPHONE ENCOUNTER
----- Message from Marin Mccoy MD sent at 3/13/2024  4:30 PM CDT -----  For what is she requesting referral?  ----- Message -----  From: Riri Barajas LPN  Sent: 3/13/2024   4:00 PM CDT  To: Marin Mccoy MD      ----- Message -----  From: Divya Rodriguez  Sent: 3/13/2024   3:46 PM CDT  To: [facility] Staff    .Type:  Needs Medical Advice    Who Called: pt  Symptoms (please be specific):    How long has patient had these symptoms:    Pharmacy name and phone #:    Would the patient rather a call back or a response via [facility]? Call back   Best Call Back Number: [phone]  Additional Information: pt is requesting a referral be sent to dr Ordaz neurologist fax [phone]

## 2024-03-21 NOTE — PROGRESS NOTES
"..Follow-up (6 month follow up)       HISTORY OF PRESENT ILLNESS    This 63 y.o. female patient presents to the clinic today for a wellness CPX.  She has been feeling well.  When patient was seen on 02/15/2024 she had injured her right ankle and right foot.  X-rays of right foot revealed a questionable nondisplaced fracture of the base of the 1st proximal phalanx.  She has been sleeping well.    Her appetite has been normal.  She is .  She has 3 children, 5 grandchildren and 1 great grandchild.  She does not exercise.  Oncologist: Dr. Wang; history of right breast cancer diagnosed in June 2016.  Last OV 3/11/2024; discontinued Femara, Mammogram 7/08/2024.   DEXA  02/01/2023:  L1 L2 T-score-1.5, T-score left femoral neck-2.2, T-score left total hip-1 3, T-score right femoral neck -1.8, T-score right total hip-1.1.  Ten year probability of major osteoporotic fracture is 33.8% and 3.5% for hip fracture; receives Prolia every 6 months.  Last EGD/colonoscopy 06/14/2023: Dr Magno Esqueda;         The patient's Health Maintenance was reviewed and the following appears to be due at this time:   Health Maintenance Due   Topic Date Due    Hepatitis C Screening  Never done    HIV Screening  Never done    RSV Vaccine (Age 60+ and Pregnant patients) (1 - 1-dose 60+ series) Never done    Eye Exam  09/08/2022    COVID-19 Vaccine (3 - 2023-24 season) 09/01/2023       ..  Past Medical History:   Diagnosis Date    Anxiety     GERD (gastroesophageal reflux disease)     Hypercholesterolemia     Mitral valve prolapse           ..  Past Surgical History:   Procedure Laterality Date    APPENDECTOMY      BACK SURGERY  11/17/2021    BREAST LUMPECTOMY Right     CHOLECYSTECTOMY      EYE SURGERY Right     HYSTERECTOMY Bilateral 02/02/2011    OOPHORECTOMY Bilateral 02/02/2011          Current Outpatient Medications   Medication Instructions    atorvastatin (LIPITOR) 80 mg, Oral, Daily    BD DARÍO 2ND GEN PEN NEEDLE 32 gauge x 5/32" Ndle " use as directed    cetirizine (ZYRTEC) 10 mg, Oral, Daily    cyclobenzaprine (FLEXERIL) 10 mg, Oral    DULoxetine (CYMBALTA) 60 mg, Oral, Daily    ergocalciferol (VITAMIN D2) 50,000 Units, Oral, Every 7 days    fluticasone propionate (FLONASE) 50 mcg/actuation nasal spray 1 spray, Each Nostril, 2 times daily PRN    gabapentin (NEURONTIN) 300 mg, Oral, 2 times daily    insulin degludec (TRESIBA FLEXTOUCH U-200) 200 unit/mL (3 mL) insulin pen INJECT UNDER THE SKIN BETWEEN 60-80 UNITS PER SLIDING SCALE (OR 70 UNITS) ONCE DAILY AS DIRECTED    linaCLOtide (LINZESS) 72 mcg, Oral, Before breakfast    magnesium oxide (MAG-OX) 400 mg, Oral, Daily    naproxen (NAPROSYN) 500 mg, Oral, 2 times daily    ondansetron (ZOFRAN-ODT) 4 mg, Oral, Every 6 hours PRN    ONETOUCH VERIO TEST STRIPS Strp 2 times daily, Test    pantoprazole (PROTONIX) 40 mg, Oral, Daily    polycarbophil (FIBERCON) 625 mg tablet 2 tablets, Oral, 2 times daily    SYNJARDY XR 12.5-1,000 mg TBph 1 tablet, Oral, 2 times daily    topiramate (TOPAMAX) 50 mg, Oral, Daily, At bedtime    traMADoL (ULTRAM) 50 mg, Oral, Every 6 hours PRN    TRULICITY 0.75 mg, Subcutaneous, Every 7 days         ..  Social History     Socioeconomic History    Marital status:     Number of children: 3   Occupational History    Occupation: retired   Tobacco Use    Smoking status: Never    Smokeless tobacco: Never   Substance and Sexual Activity    Alcohol use: Yes     Comment: Occasionally    Drug use: Not Currently     Types: Amphetamines    Sexual activity: Not Currently     Partners: Male     Birth control/protection: None     Comment:           ..  Family History   Problem Relation Age of Onset    Heart disease Mother     Diabetes type II Mother     Heart disease Father     COPD Father     Uterine cancer Sister     Aneurysm Sister     No Known Problems Brother         MVA    No Known Problems Brother         MVA    Depression Brother         suicide    Chronic back pain  Brother     Ulcers Brother     Asthma Daughter     Asthma Daughter     Breast cancer Other 56        right breast cancer          ..  Review of patient's allergies indicates:   Allergen Reactions    Baclofen Shortness Of Breath    Morphine Shortness Of Breath    Toradol [ketorolac] Shortness Of Breath    Celery     Midazolam     Morpholine analogues     Pineapple     Sulfa (sulfonamide antibiotics)           ..  Immunization History   Administered Date(s) Administered    COVID-19, MRNA, LN-S, PF (Pfizer) (Purple Cap) 12/22/2021    COVID-19, vector-nr, rS-Ad26, PF (Naviscan) 03/05/2021    Influenza (FLUBLOK) - Quadrivalent - Recombinant - PF *Preferred* (egg allergy) 11/07/2022    Influenza - Quadrivalent - PF *Preferred* (6 months and older) 01/08/2016, 02/17/2017, 09/27/2019, 11/18/2021, 11/01/2022, 08/11/2023    Influenza - Trivalent (ADULT) 10/10/2012    Influenza - Trivalent - PF (ADULT) 10/10/2012, 11/08/2018    Pneumococcal Conjugate - 13 Valent 03/07/2016    Pneumococcal Polysaccharide - 23 Valent 08/16/2002, 02/17/2017, 11/18/2021    Tdap 01/07/2016    Zoster Recombinant 11/18/2021, 01/25/2022          REVIEW OF SYSTEMS:    GENERAL:   no unexplained wt gain/loss,  fever, + fatigue, chills, night sweats or weakness  HEENT: no sore throat, ear pain, sinus pressure, nasal congestion, or rhinorrhea, + allergies  VISION: no vision changes, glaucoma, + history of bilateral cataract surgery,reports bilateral eye pain since cataract surgery, + glasses, normally sees optometrist at [facility]   CARDIAC: + chest pain: occurs infrequently, no related to activity, lasts 30-45 seconds, sharp in nature, palpitations, dyspnea on exertion, orthopnea, + mitral valve prolapse, + hypercholesterolemia  RESPIRATORY: no cough, wheezing, sputum production, or SOB, + history of asthma  GI: no abdominal pain, n&v, + GERD, + constipation, diarrhea,  blood in stool or  (--)family history of colon cancer    : no dysuria, hematuria,  "frequency,  urgency, + stress incontinence,  vaginal discharge,  abnormal vaginal bleeding, urine has been lightly pink tinged lately  MUSC/SKEL:  no myalgia, weakness, edema, + arthralgia: hips, no joint effusion  SKIN:  No rash, hives, itching or sores  NEURO:  + migraine headaches, numbness,  tingling, weakness, or dizziness  PSYCH:  + anxiety,  depression,  irritability,  suicidal ideation or hallucinations  ENDO:  No polyuria, polydipsia,  polyphagia, + diabetes mellitus, + osteopenia  HEME:  No bruising,  lymphadenopathy, bleeding disorders, + iron deficiency anemia   Oncology:  History of right breast cancer diagnosed 2016, saw Dr Wang recently, Femara discontinued           PHYSICAL EXAM:    Visit Vitals  /66   Pulse 93   Temp 98 °F (36.7 °C)   Ht 5' 4" (1.626 m)   Wt 71.6 kg (157 lb 12.8 oz)   SpO2 (!) 94%   BMI 27.09 kg/m²       GENERAL:  Well-developed well-nourished white female in NAD, alert and oriented x 3  SKIN:  no rash or abnormal appearing skin lesions  HEENT:  PERRLA, EOMI, mouth wnl, throat wnl, EAC and TM wnl bilaterally  NECK:  FROM, no lymphadenopathy, no thyroid abnormalities palpable  CHEST:  CTA bilaterally no wheezes, crackles or rubs  CARDIAC:  RRR, no murmurs audible  ABDOMEN:  Soft, nontender, nondistended, NBSx4, no rebound or guarding, no HSM  EXTREMITIES:  no clubbing, cyanosis, or edema.  joints wnl. +2 DP/PT pulse bilaterally  NEURO:  no sensory or motor deficits noted. CN II-XII intact. Gait wnl.   .Protective Sensation (w/ 10 gram monofilament):  Right: Absent  Left: Absent    Visual Inspection:  Normal -  Bilateral    Pedal Pulses:   Right: Present  Left: Present    Posterior Tibialis Pulses:   Right:Present  Left: Present           ASSESSMENT AND PLAN     1. Encounter for wellness examination in adult  Overview:  Patient presents for wellness examination.    She has been feeling well.  Patient encouraged to increase physical activity and exercise.    Oncology:  Dr. Wang; " history of right breast cancer.  Last office visit 02/2022.  Patient on Femara.  Last DEXA scan 09/2021; osteopenia, patient is on Prolia every 6 months with last injection being in February.  Cologuard 10/2020: Negative; patient has colonoscopy scheduled on 04/19/2023.  Cardiology:  Dr. Chavez; she is not seen him in a while.  Her anxiety and depression have been stable.    Her reflux disease has been well controlled.  Her migraines are improved.  Labs pending.  Labs pending.    03/22/2024:  atradu presents for wellness examination.    She has been feeling well.  Patient encouraged to increase physical activity and exercise.    Oncology:  Dr. Wang; history of right breast cancer.  Last office visit 03/11/2024. Femara discontinued.  Mammogram scheduled for 07/08/2024.  Last DEXA scan 09/2021; osteopenia, patient is on Prolia every 6 months with last injection being in February.  Last EGD/colonoscopy 6/14/2023: Dr. Magno Esqueda.  Her anxiety and depression have been stable.    Her reflux disease has been well controlled.  Her migraines are doing well; continue Topamax.  Labs pendin        2. Type 2 diabetes mellitus with diabetic polyneuropathy, with long-term current use of insulin  Overview:  A1c, microalbumin and lipid profile pending.    Foot exam performed today; neuropathy noted.    Last eye exam 2022.  Continue ADA diet; limit intake of sugary foods and refined carbohydrates.  Continue Trulicity, SynJardy and Tresiba.    09/21/2023:  Labs 03/2023: A1c 6.4.  Microalbumin 30 milligrams/liter.  Foot exam performed today.  Continue ADA diet; limit intake of sugary foods and refined carbohydrates.    02/16/2024:  Refills for Trulicity sent.  Labs 09/2023:  A1c 6.2.  03/2023: Microalbumin 30 milligrams/liter.  Patient advised to have eyes examined.    03/22/2024:  A1c and microalbumin level pending.  Foot exam performed today.    Continue ADA diet; limit intake of sugary foods and refined  carbohydrates.    Orders:  -     insulin degludec (TRESIBA FLEXTOUCH U-200) 200 unit/mL (3 mL) insulin pen; INJECT UNDER THE SKIN BETWEEN 60-80 UNITS PER SLIDING SCALE (OR 70 UNITS) ONCE DAILY AS DIRECTED  Dispense: 27 pen ; Refill: 1  -     Hemoglobin A1C; Future; Expected date: 03/22/2024  -     Microalbumin/Creatinine Ratio, Urine; Future; Expected date: 03/22/2024    3. Migraine without aura and without status migrainosus, not intractable  Overview:  Her headaches are doing much better; they are decreased in frequency and intensity.    Continue Topamax once daily; refills sent.    03/22/2024:  Her headaches are doing much better; they are decreased in frequency and intensity.    Continue Topamax once daily; refills sent.      4. Anxiety  Overview:  Patient states her anxiety has been doing well; continue duloxetine.  Refills sent.  She denies any panic attacks, sadness or depression.    03/22/2024:  Patient states her anxiety has been doing well; continue duloxetine.  Refills sent.  She denies any panic attacks, sadness or depression.    Orders:  -     DULoxetine (CYMBALTA) 60 MG capsule; Take 1 capsule (60 mg total) by mouth once daily.  Dispense: 90 capsule; Refill: 3    5. Hypercholesterolemia  Overview:  Patient has been compliant with medications; refills sent.    Lipid profile pending.    Continue low-cholesterol/low-fat diet.    09/21/2023:  Lipid profile 03/2023:  Total cholesterol 121, HDL 46, triglycerides 170 and LDL 47.    03/22/2024:  Patient has been compliant with medications; refills sent.    Continue low-cholesterol/low-fat diet.    Lipid profile pending.        Orders:  -     Lipid Panel; Future; Expected date: 03/22/2024    6. Mitral valve prolapse  Overview:  Stable on metoprolol; followed by Dr. Chavez.    03/22/2024:  Stable on metoprolol; refills sent.      7. Chronic kidney disease, stage 3a  Overview:  Labs 3/08/2024: BUN 31.9/creatinine 1.34.   Drink plenty of water.    Avoid NSAIDs.         Orders:  -     Magnesium; Future; Expected date: 03/22/2024  -     Phosphorus; Future; Expected date: 03/22/2024  -     PTH, Intact; Future; Expected date: 03/22/2024    8. Malignant neoplasm of upper-outer quadrant of right breast in female, estrogen receptor positive  Overview:  Patient with history of breast cancer diagnosed in 2016.    She is currently on Femara.  Followed by Dr. Wang; last saw a nurse practitioner in February.  CBC and CMP done at that time.      9. Iron deficiency anemia, unspecified iron deficiency anemia type  Overview:  Followed by Dr. Wang.  Last hemoglobin and hematocrit 12.3 and 40.8.    03/22/2024:  CBC 3 08/2024:  Hemoglobin/hematocrit: 12.2/38.9.      10. Vitamin D deficiency  Overview:  Patient is on 60968 units weekly; vitamin-D level pending.    03/22/2024: Patient continues to take 54631 units weekly; vitamin-D level pending.    Orders:  -     Vitamin D; Future; Expected date: 03/22/2024    11. Gastroesophageal reflux disease, unspecified whether esophagitis present  Overview:  Controlled with Protonix; refills sent.    03/22/2024:  Controlled with Protonix; refills sent.    Orders:  -     pantoprazole (PROTONIX) 40 MG tablet; Take 1 tablet (40 mg total) by mouth once daily.  Dispense: 90 tablet; Refill: 3    12. Chronic idiopathic constipation  Overview:  Improved with FiberCon daily; she has a colonoscopy scheduled for next month with Dr. Esqueda.    03/25/2024:  Patient continues to have issues with constipation.  She is diarrhea if she takes Linzess daily.  She is tried taking medication every other day and still has diarrhea.    Will decrease her dosage to 72 mg every other day and see how she does.      13. Osteopenia of multiple sites  Overview:  Followed by Dr. Wang; patient is on Prolia injections every 6 months.  Her last injection was in February.  Ensure adequate calcium and vitamin-D intake.    Regular weight-bearing exercise encouraged.    03/22/2024:  Last  DEXA scan 02/01/2023; patient is on Prolia injections.  Followed by Dr. Wang      14. Type 2 diabetes mellitus without complication, with long-term current use of insulin  -     dulaglutide (TRULICITY) 0.75 mg/0.5 mL pen injector; Inject 0.75 mg into the skin every 7 days.  Dispense: 12 pen ; Refill: 1  -     Hemoglobin A1C; Future; Expected date: 03/22/2024  -     Microalbumin/Creatinine Ratio, Urine; Future; Expected date: 03/22/2024    15. Intractable migraine with status migrainosus, unspecified migraine type  -     topiramate (TOPAMAX) 50 MG tablet; Take 1 tablet (50 mg total) by mouth once daily. At bedtime  Dispense: 90 tablet; Refill: 3    Other orders  -     atorvastatin (LIPITOR) 80 MG tablet; Take 1 tablet (80 mg total) by mouth once daily.  Dispense: 90 tablet; Refill: 3  -     gabapentin (NEURONTIN) 300 MG capsule; Take 1 capsule (300 mg total) by mouth 2 (two) times daily.  Dispense: 180 capsule; Refill: 3  -     naproxen (NAPROSYN) 500 MG tablet; Take 1 tablet (500 mg total) by mouth 2 (two) times daily.  Dispense: 180 tablet; Refill: 1  -     traMADoL (ULTRAM) 50 mg tablet; Take 1 tablet (50 mg total) by mouth every 6 (six) hours as needed for Pain.  Dispense: 30 tablet; Refill: 2         ..Follow up in about 6 months (around 9/22/2024).     Future Appointments   Date Time Provider Department Center   7[facility]1/2024  7:[facility]   7/8/2024  9:30 AM Katelyn Benitez, FNP [facility] HEMONC [facility]   7/8/2024 10:45 AM [facility] BREAST CENTER MAMMO2 SCR2 [facility] [facility]   8/26/2024  1:00 PM INJECTION CHAIR 01, [facility] CHEMOTHERAPY INFUSION [facility] CHEMO [facility]   9/23/2024 10:30 AM Marin Mccoy MD [facility] [place]

## 2024-03-22 ENCOUNTER — OFFICE VISIT (OUTPATIENT)
Dept: PRIMARY CARE CLINIC | Facility: CLINIC | Age: 64
End: 2024-03-22
Payer: COMMERCIAL

## 2024-03-22 VITALS
SYSTOLIC BLOOD PRESSURE: 103 MMHG | WEIGHT: 157.81 LBS | BODY MASS INDEX: 26.94 KG/M2 | HEART RATE: 93 BPM | DIASTOLIC BLOOD PRESSURE: 66 MMHG | HEIGHT: 64 IN | OXYGEN SATURATION: 94 % | TEMPERATURE: 98 F

## 2024-03-22 DIAGNOSIS — E11.42 TYPE 2 DIABETES MELLITUS WITH DIABETIC POLYNEUROPATHY, WITH LONG-TERM CURRENT USE OF INSULIN: ICD-10-CM

## 2024-03-22 DIAGNOSIS — M85.89 OSTEOPENIA OF MULTIPLE SITES: ICD-10-CM

## 2024-03-22 DIAGNOSIS — G43.009 MIGRAINE WITHOUT AURA AND WITHOUT STATUS MIGRAINOSUS, NOT INTRACTABLE: ICD-10-CM

## 2024-03-22 DIAGNOSIS — Z79.4 TYPE 2 DIABETES MELLITUS WITHOUT COMPLICATION, WITH LONG-TERM CURRENT USE OF INSULIN: ICD-10-CM

## 2024-03-22 DIAGNOSIS — F41.9 ANXIETY: ICD-10-CM

## 2024-03-22 DIAGNOSIS — E11.9 TYPE 2 DIABETES MELLITUS WITHOUT COMPLICATION, WITH LONG-TERM CURRENT USE OF INSULIN: ICD-10-CM

## 2024-03-22 DIAGNOSIS — K21.9 GASTROESOPHAGEAL REFLUX DISEASE, UNSPECIFIED WHETHER ESOPHAGITIS PRESENT: ICD-10-CM

## 2024-03-22 DIAGNOSIS — D50.9 IRON DEFICIENCY ANEMIA, UNSPECIFIED IRON DEFICIENCY ANEMIA TYPE: ICD-10-CM

## 2024-03-22 DIAGNOSIS — E78.00 HYPERCHOLESTEROLEMIA: ICD-10-CM

## 2024-03-22 DIAGNOSIS — Z00.00 ENCOUNTER FOR WELLNESS EXAMINATION IN ADULT: Primary | ICD-10-CM

## 2024-03-22 DIAGNOSIS — Z79.4 TYPE 2 DIABETES MELLITUS WITH DIABETIC POLYNEUROPATHY, WITH LONG-TERM CURRENT USE OF INSULIN: ICD-10-CM

## 2024-03-22 DIAGNOSIS — G43.911 INTRACTABLE MIGRAINE WITH STATUS MIGRAINOSUS, UNSPECIFIED MIGRAINE TYPE: ICD-10-CM

## 2024-03-22 DIAGNOSIS — E55.9 VITAMIN D DEFICIENCY: ICD-10-CM

## 2024-03-22 DIAGNOSIS — K59.04 CHRONIC IDIOPATHIC CONSTIPATION: ICD-10-CM

## 2024-03-22 DIAGNOSIS — N18.31 CHRONIC KIDNEY DISEASE, STAGE 3A: ICD-10-CM

## 2024-03-22 DIAGNOSIS — C50.411 MALIGNANT NEOPLASM OF UPPER-OUTER QUADRANT OF RIGHT BREAST IN FEMALE, ESTROGEN RECEPTOR POSITIVE: ICD-10-CM

## 2024-03-22 DIAGNOSIS — Z17.0 MALIGNANT NEOPLASM OF UPPER-OUTER QUADRANT OF RIGHT BREAST IN FEMALE, ESTROGEN RECEPTOR POSITIVE: ICD-10-CM

## 2024-03-22 DIAGNOSIS — I34.1 MITRAL VALVE PROLAPSE: ICD-10-CM

## 2024-03-22 PROCEDURE — 1160F RVW MEDS BY RX/DR IN RCRD: CPT | Mod: CPTII,,, | Performed by: FAMILY MEDICINE

## 2024-03-22 PROCEDURE — 1159F MED LIST DOCD IN RCRD: CPT | Mod: CPTII,,, | Performed by: FAMILY MEDICINE

## 2024-03-22 PROCEDURE — 3078F DIAST BP <80 MM HG: CPT | Mod: CPTII,,, | Performed by: FAMILY MEDICINE

## 2024-03-22 PROCEDURE — 99396 PREV VISIT EST AGE 40-64: CPT | Mod: ,,, | Performed by: FAMILY MEDICINE

## 2024-03-22 PROCEDURE — 3008F BODY MASS INDEX DOCD: CPT | Mod: CPTII,,, | Performed by: FAMILY MEDICINE

## 2024-03-22 PROCEDURE — 3074F SYST BP LT 130 MM HG: CPT | Mod: CPTII,,, | Performed by: FAMILY MEDICINE

## 2024-03-22 RX ORDER — DULAGLUTIDE 0.75 MG/.5ML
0.75 INJECTION, SOLUTION SUBCUTANEOUS
Qty: 12 PEN | Refills: 1 | Status: SHIPPED | OUTPATIENT
Start: 2024-03-22 | End: 2024-09-18

## 2024-03-22 RX ORDER — PANTOPRAZOLE SODIUM 40 MG/1
40 TABLET, DELAYED RELEASE ORAL DAILY
Qty: 90 TABLET | Refills: 3 | Status: SHIPPED | OUTPATIENT
Start: 2024-03-22

## 2024-03-22 RX ORDER — CYCLOBENZAPRINE HCL 10 MG
10 TABLET ORAL
COMMUNITY
Start: 2024-01-31

## 2024-03-22 RX ORDER — TRAMADOL HYDROCHLORIDE 50 MG/1
50 TABLET ORAL EVERY 6 HOURS PRN
COMMUNITY
Start: 2024-01-31 | End: 2024-03-22 | Stop reason: SDUPTHER

## 2024-03-22 RX ORDER — NAPROXEN 500 MG/1
500 TABLET ORAL 2 TIMES DAILY
Qty: 180 TABLET | Refills: 1 | Status: SHIPPED | OUTPATIENT
Start: 2024-03-22

## 2024-03-22 RX ORDER — GABAPENTIN 300 MG/1
300 CAPSULE ORAL 2 TIMES DAILY
Qty: 180 CAPSULE | Refills: 3 | Status: SHIPPED | OUTPATIENT
Start: 2024-03-22 | End: 2025-03-22

## 2024-03-22 RX ORDER — NAPROXEN 500 MG/1
500 TABLET ORAL 2 TIMES DAILY
COMMUNITY
Start: 2024-02-16 | End: 2024-03-22 | Stop reason: SDUPTHER

## 2024-03-22 RX ORDER — INSULIN DEGLUDEC 200 U/ML
INJECTION, SOLUTION SUBCUTANEOUS
Qty: 27 PEN | Refills: 1 | Status: SHIPPED | OUTPATIENT
Start: 2024-03-22

## 2024-03-22 RX ORDER — TRAMADOL HYDROCHLORIDE 50 MG/1
50 TABLET ORAL EVERY 6 HOURS PRN
Qty: 30 TABLET | Refills: 2 | Status: SHIPPED | OUTPATIENT
Start: 2024-03-22

## 2024-03-22 RX ORDER — DULOXETIN HYDROCHLORIDE 60 MG/1
60 CAPSULE, DELAYED RELEASE ORAL DAILY
Qty: 90 CAPSULE | Refills: 3 | Status: SHIPPED | OUTPATIENT
Start: 2024-03-22

## 2024-03-22 RX ORDER — TOPIRAMATE 50 MG/1
50 TABLET, FILM COATED ORAL DAILY
Qty: 90 TABLET | Refills: 3 | Status: SHIPPED | OUTPATIENT
Start: 2024-03-22 | End: 2025-03-22

## 2024-03-22 RX ORDER — ATORVASTATIN CALCIUM 80 MG/1
80 TABLET, FILM COATED ORAL DAILY
Qty: 90 TABLET | Refills: 3 | Status: SHIPPED | OUTPATIENT
Start: 2024-03-22 | End: 2025-03-22

## 2024-03-25 ENCOUNTER — LAB VISIT (OUTPATIENT)
Dept: LAB | Facility: HOSPITAL | Age: 64
End: 2024-03-25
Attending: FAMILY MEDICINE
Payer: COMMERCIAL

## 2024-03-25 ENCOUNTER — TELEPHONE (OUTPATIENT)
Dept: PRIMARY CARE CLINIC | Facility: CLINIC | Age: 64
End: 2024-03-25
Payer: COMMERCIAL

## 2024-03-25 ENCOUNTER — TELEPHONE (OUTPATIENT)
Dept: HEMATOLOGY/ONCOLOGY | Facility: CLINIC | Age: 64
End: 2024-03-25
Payer: COMMERCIAL

## 2024-03-25 DIAGNOSIS — R30.0 DYSURIA: ICD-10-CM

## 2024-03-25 DIAGNOSIS — E11.42 TYPE 2 DIABETES MELLITUS WITH DIABETIC POLYNEUROPATHY, WITH LONG-TERM CURRENT USE OF INSULIN: ICD-10-CM

## 2024-03-25 DIAGNOSIS — Z79.4 TYPE 2 DIABETES MELLITUS WITH DIABETIC POLYNEUROPATHY, WITH LONG-TERM CURRENT USE OF INSULIN: ICD-10-CM

## 2024-03-25 DIAGNOSIS — Z79.4 TYPE 2 DIABETES MELLITUS WITHOUT COMPLICATION, WITH LONG-TERM CURRENT USE OF INSULIN: ICD-10-CM

## 2024-03-25 DIAGNOSIS — E11.9 TYPE 2 DIABETES MELLITUS WITHOUT COMPLICATION, WITH LONG-TERM CURRENT USE OF INSULIN: ICD-10-CM

## 2024-03-25 DIAGNOSIS — E78.00 HYPERCHOLESTEROLEMIA: ICD-10-CM

## 2024-03-25 DIAGNOSIS — E55.9 VITAMIN D DEFICIENCY: ICD-10-CM

## 2024-03-25 DIAGNOSIS — R30.0 DYSURIA: Primary | ICD-10-CM

## 2024-03-25 DIAGNOSIS — N18.31 CHRONIC KIDNEY DISEASE, STAGE 3A: ICD-10-CM

## 2024-03-25 LAB
APPEARANCE UR: CLEAR
BACTERIA #/AREA URNS AUTO: ABNORMAL /HPF
BILIRUB UR QL STRIP.AUTO: NEGATIVE
CHOLEST SERPL-MCNC: 154 MG/DL
CHOLEST/HDLC SERPL: 3 {RATIO} (ref 0–5)
COLOR UR AUTO: ABNORMAL
CREAT UR-MCNC: 100.9 MG/DL (ref 45–106)
DEPRECATED CALCIDIOL+CALCIFEROL SERPL-MC: 78.3 NG/ML (ref 30–80)
EST. AVERAGE GLUCOSE BLD GHB EST-MCNC: 145.6 MG/DL
GLUCOSE UR QL STRIP.AUTO: ABNORMAL
HBA1C MFR BLD: 6.7 %
HDLC SERPL-MCNC: 57 MG/DL (ref 35–60)
KETONES UR QL STRIP.AUTO: NEGATIVE
LDLC SERPL CALC-MCNC: 66 MG/DL (ref 50–140)
LEUKOCYTE ESTERASE UR QL STRIP.AUTO: NEGATIVE
MAGNESIUM SERPL-MCNC: 1.9 MG/DL (ref 1.6–2.6)
MICROALBUMIN UR-MCNC: 8.3 UG/ML
MICROALBUMIN/CREAT RATIO PNL UR: 8.2 MG/GM CR (ref 0–30)
MUCOUS THREADS URNS QL MICRO: ABNORMAL /LPF
NITRITE UR QL STRIP.AUTO: NEGATIVE
PH UR STRIP.AUTO: 6.5 [PH]
PHOSPHATE SERPL-MCNC: 3.4 MG/DL (ref 2.3–4.7)
PROT UR QL STRIP.AUTO: NEGATIVE
PTH-INTACT SERPL-MCNC: 60.2 PG/ML (ref 8.7–77)
RBC #/AREA URNS AUTO: ABNORMAL /HPF
RBC UR QL AUTO: NEGATIVE
SP GR UR STRIP.AUTO: 1.02 (ref 1–1.03)
SQUAMOUS #/AREA URNS LPF: ABNORMAL /HPF
TRIGL SERPL-MCNC: 154 MG/DL (ref 37–140)
UROBILINOGEN UR STRIP-ACNC: NORMAL
VLDLC SERPL CALC-MCNC: 31 MG/DL
WBC #/AREA URNS AUTO: ABNORMAL /HPF

## 2024-03-25 PROCEDURE — 83735 ASSAY OF MAGNESIUM: CPT

## 2024-03-25 PROCEDURE — 82306 VITAMIN D 25 HYDROXY: CPT

## 2024-03-25 PROCEDURE — 84100 ASSAY OF PHOSPHORUS: CPT

## 2024-03-25 PROCEDURE — 83970 ASSAY OF PARATHORMONE: CPT

## 2024-03-25 PROCEDURE — 80061 LIPID PANEL: CPT

## 2024-03-25 PROCEDURE — 82043 UR ALBUMIN QUANTITATIVE: CPT

## 2024-03-25 PROCEDURE — 83036 HEMOGLOBIN GLYCOSYLATED A1C: CPT

## 2024-03-25 PROCEDURE — 36415 COLL VENOUS BLD VENIPUNCTURE: CPT

## 2024-03-25 PROCEDURE — 81015 MICROSCOPIC EXAM OF URINE: CPT

## 2024-03-25 NOTE — TELEPHONE ENCOUNTER
----- Message from Aubrie Garcia sent at 3/25/2024 11:10 AM CDT -----  .Type:  Needs Medical Advice    Who Called: pt  Symptoms (please be specific):    How long has patient had these symptoms:    Pharmacy name and phone #:    Would the patient rather a call back or a response via [facility]? cb  Best Call Back Number: [phone]  Additional Information: [id number]

## 2024-03-25 NOTE — TELEPHONE ENCOUNTER
Patient stated that you wanted to know the dose of her linzess. Her current dose is 145mcg. Patient also completed her labs and wanted to know if we could give those results to her sometime today. I let her know they had not been reviewed yet but once reviewed we would call.

## 2024-03-25 NOTE — TELEPHONE ENCOUNTER
Patient has major concerns about her CKD stage 3a, she says she has been having back pain that alternates sides. She says oncology told her that her CKD needs addressed and managed by PCP. She wants to figure out what's going on, she thinks something else is going on. I did inform her per MD that we are keeping close eye on her kidney function and to not take NSAIDs, tylenol only for pain. She wants some imaging to see what is going on with her. Please advise

## 2024-03-25 NOTE — TELEPHONE ENCOUNTER
Patient called states that since her blood work on 3/8/24 she's had cloudy, foul smelling urine. At time her urine can be pink in color. Reports pain that radiates from left to right. States her back pain can be a 10 times. Currently taking naproxen which helps. Advised me that she did call and see Dr. Mccoy who ordered a microalbumin urin culture. She is not sure if its in relation to her abnormal lab results related to her kidneys or a chronic UTI.    Message sent to cleve in regards to her symptoms.

## 2024-03-26 DIAGNOSIS — N18.31 CHRONIC KIDNEY DISEASE, STAGE 3A: ICD-10-CM

## 2024-03-26 DIAGNOSIS — R10.9 FLANK PAIN: Primary | ICD-10-CM

## 2024-04-09 ENCOUNTER — HOSPITAL ENCOUNTER (OUTPATIENT)
Dept: RADIOLOGY | Facility: HOSPITAL | Age: 64
Discharge: HOME | End: 2024-04-09
Attending: FAMILY MEDICINE
Payer: COMMERCIAL

## 2024-04-09 DIAGNOSIS — R10.9 FLANK PAIN: ICD-10-CM

## 2024-04-09 DIAGNOSIS — N18.31 CHRONIC KIDNEY DISEASE, STAGE 3A: ICD-10-CM

## 2024-04-09 PROCEDURE — 76770 US EXAM ABDO BACK WALL COMP: CPT | Mod: TC

## 2024-05-20 PROBLEM — Z09 HOSPITAL DISCHARGE FOLLOW-UP: Status: RESOLVED | Noted: 2024-02-14 | Resolved: 2024-05-20

## 2024-07-02 ENCOUNTER — LAB VISIT (OUTPATIENT)
Dept: LAB | Facility: HOSPITAL | Age: 64
End: 2024-07-02
Attending: INTERNAL MEDICINE
Payer: COMMERCIAL

## 2024-07-02 DIAGNOSIS — C50.411 MALIGNANT NEOPLASM OF UPPER-OUTER QUADRANT OF RIGHT BREAST IN FEMALE, ESTROGEN RECEPTOR POSITIVE: ICD-10-CM

## 2024-07-02 DIAGNOSIS — Z17.0 MALIGNANT NEOPLASM OF UPPER-OUTER QUADRANT OF RIGHT BREAST IN FEMALE, ESTROGEN RECEPTOR POSITIVE: ICD-10-CM

## 2024-07-02 LAB
ALBUMIN SERPL-MCNC: 4.1 G/DL (ref 3.4–4.8)
ALBUMIN/GLOB SERPL: 1.3 RATIO (ref 1.1–2)
ALP SERPL-CCNC: 65 UNIT/L (ref 40–150)
ALT SERPL-CCNC: 15 UNIT/L (ref 0–55)
ANION GAP SERPL CALC-SCNC: 12 MEQ/L
AST SERPL-CCNC: 17 UNIT/L (ref 5–34)
BASOPHILS # BLD AUTO: 0.06 X10(3)/MCL
BASOPHILS NFR BLD AUTO: 0.6 %
BILIRUB SERPL-MCNC: 0.5 MG/DL
BUN SERPL-MCNC: 27.2 MG/DL (ref 9.8–20.1)
CALCIUM SERPL-MCNC: 9.7 MG/DL (ref 8.4–10.2)
CEA SERPL-MCNC: 4.99 NG/ML (ref 0–3)
CHLORIDE SERPL-SCNC: 96 MMOL/L (ref 98–107)
CO2 SERPL-SCNC: 25 MMOL/L (ref 23–31)
CREAT SERPL-MCNC: 1.66 MG/DL (ref 0.55–1.02)
CREAT/UREA NIT SERPL: 16
EOSINOPHIL # BLD AUTO: 0.52 X10(3)/MCL (ref 0–0.9)
EOSINOPHIL NFR BLD AUTO: 5 %
ERYTHROCYTE [DISTWIDTH] IN BLOOD BY AUTOMATED COUNT: 13.3 % (ref 11.5–17)
GFR SERPLBLD CREATININE-BSD FMLA CKD-EPI: 34 ML/MIN/1.73/M2
GLOBULIN SER-MCNC: 3.1 GM/DL (ref 2.4–3.5)
GLUCOSE SERPL-MCNC: 445 MG/DL (ref 82–115)
HCT VFR BLD AUTO: 41.3 % (ref 37–47)
HGB BLD-MCNC: 13.3 G/DL (ref 12–16)
IMM GRANULOCYTES # BLD AUTO: 0.02 X10(3)/MCL (ref 0–0.04)
IMM GRANULOCYTES NFR BLD AUTO: 0.2 %
LYMPHOCYTES # BLD AUTO: 1.35 X10(3)/MCL (ref 0.6–4.6)
LYMPHOCYTES NFR BLD AUTO: 13.1 %
MCH RBC QN AUTO: 31.4 PG (ref 27–31)
MCHC RBC AUTO-ENTMCNC: 32.2 G/DL (ref 33–36)
MCV RBC AUTO: 97.4 FL (ref 80–94)
MONOCYTES # BLD AUTO: 0.58 X10(3)/MCL (ref 0.1–1.3)
MONOCYTES NFR BLD AUTO: 5.6 %
NEUTROPHILS # BLD AUTO: 7.8 X10(3)/MCL (ref 2.1–9.2)
NEUTROPHILS NFR BLD AUTO: 75.5 %
PLATELET # BLD AUTO: 173 X10(3)/MCL (ref 130–400)
PMV BLD AUTO: 9.4 FL (ref 7.4–10.4)
POTASSIUM SERPL-SCNC: 4.5 MMOL/L (ref 3.5–5.1)
PROT SERPL-MCNC: 7.2 GM/DL (ref 5.8–7.6)
RBC # BLD AUTO: 4.24 X10(6)/MCL (ref 4.2–5.4)
SODIUM SERPL-SCNC: 133 MMOL/L (ref 136–145)
WBC # BLD AUTO: 10.33 X10(3)/MCL (ref 4.5–11.5)

## 2024-07-02 PROCEDURE — 86300 IMMUNOASSAY TUMOR CA 15-3: CPT

## 2024-07-02 PROCEDURE — 36415 COLL VENOUS BLD VENIPUNCTURE: CPT

## 2024-07-02 PROCEDURE — 82378 CARCINOEMBRYONIC ANTIGEN: CPT

## 2024-07-02 PROCEDURE — 80053 COMPREHEN METABOLIC PANEL: CPT

## 2024-07-02 PROCEDURE — 85025 COMPLETE CBC W/AUTO DIFF WBC: CPT

## 2024-07-05 LAB — CANCER AG27-29 SERPL-ACNC: 13.1 U/ML

## 2024-07-08 ENCOUNTER — HOSPITAL ENCOUNTER (OUTPATIENT)
Dept: RADIOLOGY | Facility: HOSPITAL | Age: 64
Discharge: HOME | End: 2024-07-08
Attending: INTERNAL MEDICINE
Payer: COMMERCIAL

## 2024-07-08 ENCOUNTER — OFFICE VISIT (OUTPATIENT)
Dept: HEMATOLOGY/ONCOLOGY | Facility: CLINIC | Age: 64
End: 2024-07-08
Payer: COMMERCIAL

## 2024-07-08 VITALS
BODY MASS INDEX: 27 KG/M2 | HEIGHT: 64 IN | WEIGHT: 158.13 LBS | SYSTOLIC BLOOD PRESSURE: 107 MMHG | OXYGEN SATURATION: 97 % | HEART RATE: 91 BPM | RESPIRATION RATE: 15 BRPM | DIASTOLIC BLOOD PRESSURE: 73 MMHG | TEMPERATURE: 98 F

## 2024-07-08 DIAGNOSIS — R73.9 HYPERGLYCEMIA: ICD-10-CM

## 2024-07-08 DIAGNOSIS — Z17.0 MALIGNANT NEOPLASM OF UPPER-OUTER QUADRANT OF RIGHT BREAST IN FEMALE, ESTROGEN RECEPTOR POSITIVE: Primary | ICD-10-CM

## 2024-07-08 DIAGNOSIS — Z12.31 SCREENING MAMMOGRAM FOR BREAST CANCER: ICD-10-CM

## 2024-07-08 DIAGNOSIS — C50.411 MALIGNANT NEOPLASM OF UPPER-OUTER QUADRANT OF RIGHT BREAST IN FEMALE, ESTROGEN RECEPTOR POSITIVE: Primary | ICD-10-CM

## 2024-07-08 PROCEDURE — 77067 SCR MAMMO BI INCL CAD: CPT | Mod: TC

## 2024-07-08 PROCEDURE — 3061F NEG MICROALBUMINURIA REV: CPT | Mod: CPTII,S$GLB,, | Performed by: NURSE PRACTITIONER

## 2024-07-08 PROCEDURE — 3008F BODY MASS INDEX DOCD: CPT | Mod: CPTII,S$GLB,, | Performed by: NURSE PRACTITIONER

## 2024-07-08 PROCEDURE — 99214 OFFICE O/P EST MOD 30 MIN: CPT | Mod: S$GLB,,, | Performed by: NURSE PRACTITIONER

## 2024-07-08 PROCEDURE — 1160F RVW MEDS BY RX/DR IN RCRD: CPT | Mod: CPTII,S$GLB,, | Performed by: NURSE PRACTITIONER

## 2024-07-08 PROCEDURE — 3074F SYST BP LT 130 MM HG: CPT | Mod: CPTII,S$GLB,, | Performed by: NURSE PRACTITIONER

## 2024-07-08 PROCEDURE — 99999 PR PBB SHADOW E&M-EST. PATIENT-LVL V: CPT | Mod: PBBFAC,,, | Performed by: NURSE PRACTITIONER

## 2024-07-08 PROCEDURE — 3078F DIAST BP <80 MM HG: CPT | Mod: CPTII,S$GLB,, | Performed by: NURSE PRACTITIONER

## 2024-07-08 PROCEDURE — 3066F NEPHROPATHY DOC TX: CPT | Mod: CPTII,S$GLB,, | Performed by: NURSE PRACTITIONER

## 2024-07-08 PROCEDURE — 77063 BREAST TOMOSYNTHESIS BI: CPT | Mod: 26,,, | Performed by: RADIOLOGY

## 2024-07-08 PROCEDURE — 3044F HG A1C LEVEL LT 7.0%: CPT | Mod: CPTII,S$GLB,, | Performed by: NURSE PRACTITIONER

## 2024-07-08 PROCEDURE — 1159F MED LIST DOCD IN RCRD: CPT | Mod: CPTII,S$GLB,, | Performed by: NURSE PRACTITIONER

## 2024-07-08 PROCEDURE — 77067 SCR MAMMO BI INCL CAD: CPT | Mod: 26,,, | Performed by: RADIOLOGY

## 2024-07-08 NOTE — PROGRESS NOTES
Subjective:       Patient ID: Dede Castillo is a 63 y.o. female.    Chief Complaint:  Fatigue    Diagnosis: Stage IIA right breast cancer 6/16 (T2 N0 M0), 2.1 cm, grade II, ER/AK +, HER-2 +                    Postmenopausal s/p hysterectomy/BSO                    Iron deficiency anemia    Current Treatment: Letrozole 2.5 mg (started 4/17)    Treatment History  TCH + Perjeta x 1 -->THP x 1 --> TCH x 3 ---> XRT ---> Herceptin x 1 yr (7/17)  IV Infed 7/22/20  Letrozole 4/17- 3/24    Clinical History: Patient presented with an abnormal screening mammogram 6/1/16 showing a suspicious mass in the upper outer quadrant of the right breast.  Lesion measured 2.9 x 1.3 cm by ultrasound.  Ultrasound-guided biopsy was positive for invasive carcinoma with ductal and lobular features.  She underwent a right lumpectomy and sentinel lymph node dissection 6/23/16.  Final pathology revealed a 2.1 cm moderately differentiated infiltrating ductal carcinoma.  4 sentinel lymph nodes were negative for involvement.  ER +77%, AK +51% and HER-2 positive for overexpression by IHC and FISH.  Ki-67 expression was 13% (borderline).  There was no family history of breast cancer.  She was seen for a Medical Oncology opinion 7/21/16.  Tratment was recommended with a regimen of TCH + Perjeta for 6 cycles followed by single agent Herceptin every 3 weeks to complete one year of therapy.  She underwent placement of a left chest wall Mediport for faciliation of chemotherapy 8/5/16.  Baseline echocardiogram 8/1/16 showed normal LV size and function with an EF of 55%.  Chemotherapy was initiated 8/5/16.    She had significant toxicities following her 1st cycle of treatment requiring dose adjustment in treatment modifications.  She completed 5 cycles of adjuvant chemotherapy with Taxotere. Treatment was discontinued at that time secondary to progressive toxicities including severe fatigue, anemia, progressive fluid retention, peripheral edema, neuropathy  and myalgias. Herceptin was continued as a single agent every 3 weeks. She she completed adjuvant radiation therapy  with moderate toxicities requiring 2 brief treatment breaks. Initiation of hormonal therapy was delayed secondary to significant depression symptoms. She lost her sister and had progressive symptoms following a trial of Lyrica for her neuropathy. Her antidepressant regimen was changed by her primary care physician. Follow-up echocardiogram 17 showed a normal ejection fraction of 55%. She was started on adjuvant hormonal therapy with Letrozole .  Bone density from 17 showed osteopenia of the lumbar spine with a T score of -1.7 and osteopenia of both femoral necks with T-scores of -0.9 on the right and -1.2 on the left.  She was initially placed on Actonel which was poorly tolerated.  Once insurance approval is obtained, she was started on Prolia every 6 months.    Patient suffered a fall  resulting in a T12 compression fracture.  She underwent a T12 kyphoplasty by Dr. Feng 11/15/17. Mediport catheter became infected and was removed . Her   suddenly from a massive heart attack .  She was treated with IV Infed 20 for iron deficiency anemia with full recovery of her counts. She had a Cologuard test 10/20/20 which was negative. She was unable to tolerate the prep/NPO status for a colonoscopy.     Annual screening mammogram 22 showed a focal asymmetry in the left breast at 12:00 p.m. She reported a history of trauma to that area several weeks prior to the mammogram that caused visible bruising.  Diagnostic mammogram and left breast ultrasound 22 showed an overall mixed echogenicity mass with indistinct margins at 12:00 p.m..  Biopsy showed organizing hemorrhage and focal fat necrosis.    Bone density exam 9/3/21:  Osteopenia of the lumbar spine with a T-score of -1.5, right hip -1.1 and left hip-1.2.  Findings were stable to improved from her  previous exam.    EGD 6/14/23 showed a benign intrinsic stricture at the GE junction which was dilated.      Colonoscopy showed polyps in the cecum and ascending colon which were removed.  Follow-up exam was recommended in 5 years.    Interval History  Mrs. Castillo is here today by herself for a four month breast cancer surveillance visit.  She completed 7 years of endocrine therapy with Letrozole 3/24 and it was discontinued.  She denies any changes to her self breast exam.  She is scheduled for a screening mammogram later today.  She remains on Prolia every 6 months for her bone density.  Laboratory for this visit showed an elevated blood glucose of 445 with associated renal insufficiency.  She has not been taking her Trulicity as directed.  She has associated fatigue and polydipsia.  CEA is mildly elevated at 4.9, but CA 27-29 level is normal.  She has no signs or symptoms worrisome for disease recurrence.  She has had intermittent elevations in her CEA dating back to 2018.  Results were reviewed and discussed today with the patient.    Review of Systems   Constitutional:  Positive for fatigue. Negative for appetite change, fever and unexpected weight change.   HENT:  Negative for mouth sores, sore throat and trouble swallowing.    Eyes: Negative.  Negative for visual disturbance.   Respiratory:  Negative for cough and shortness of breath.    Cardiovascular:  Negative for chest pain, palpitations and leg swelling.   Gastrointestinal:  Positive for constipation. Negative for abdominal distention, abdominal pain, blood in stool, change in bowel habit, diarrhea, nausea and vomiting.   Endocrine: Positive for polydipsia.   Genitourinary:  Negative for dysuria, frequency and urgency.   Musculoskeletal:  Negative for arthralgias and back pain.   Integumentary:  Negative for pallor, rash, breast mass and breast tenderness.   Neurological:  Positive for headaches (mild, occasional). Negative for dizziness, weakness and  "numbness.   Hematological:  Negative for adenopathy. Does not bruise/bleed easily.   Psychiatric/Behavioral: Negative.  The patient is not nervous/anxious.    Breast: Negative for mass and tenderness      PMHx:  Diabetes mellitus with LE neuropathy, asthma, hypercholesterolemia, MVP, GERD, anxiety, anemia, migraines, Menarche age 12, 1st pregnancy 15, + OCPs, hysterectomy/BSO 50, no HRT  PSHx:  Right lumpectomy, appendectomy, hysterectomy/BSO, cholecystectomy, right eye surgery age 4, cataracts  SH:  Lifetime nonsmoker, occasional alcohol use.  Lives in [place] with her daughter.    FH:  Her sister had uterine cancer.     Objective:        /73   Pulse 91   Temp 98.3 °F (36.8 °C)   Resp 15   Ht 5' 4" (1.626 m)   Wt 71.7 kg (158 lb 1.6 oz)   SpO2 97%   BMI 27.14 kg/m²    Physical Exam  Constitutional:       Comments: Well-developed white female in NAD   HENT:      Head: Normocephalic.      Mouth/Throat:      Mouth: Mucous membranes are moist.      Pharynx: Oropharynx is clear. No posterior oropharyngeal erythema.   Eyes:      Extraocular Movements: Extraocular movements intact.      Conjunctiva/sclera: Conjunctivae normal.      Pupils: Pupils are equal, round, and reactive to light.   Cardiovascular:      Rate and Rhythm: Normal rate and regular rhythm.      Heart sounds: No murmur heard.  Pulmonary:      Comments: Lungs clear to auscultation  Chest:      Comments: Large breast bilaterally.  Well-healed incisions right breast UOQ and axilla.  No suspicious masses, skin changes or axillary nodes bilaterally.  Abdominal:      General: Bowel sounds are normal. There is no distension.      Palpations: Abdomen is soft. There is no mass.      Tenderness: There is no abdominal tenderness.   Musculoskeletal:         General: No swelling or tenderness. Normal range of motion.      Cervical back: Neck supple. No tenderness.   Lymphadenopathy:      Cervical: No cervical adenopathy.      Upper Body:      Right " upper body: No supraclavicular or axillary adenopathy.      Left upper body: No supraclavicular or axillary adenopathy.   Skin:     General: Skin is warm and dry.      Findings: No rash.   Neurological:      General: No focal deficit present.      Mental Status: She is alert and oriented to person, place, and time.      Cranial Nerves: No cranial nerve deficit.      Motor: No weakness.       ECOG SCORE    1 - Restricted in strenuous activity-ambulatory and able to carry out work of a light nature          LABORATORY  Recent Results (from the past 336 hour(s))   Cancer Antigen 27-29    Collection Time: 07/02/24  2:12 PM   Result Value Ref Range    Breast Carcinoma Assoc Ag(CA 27.29) 13.1 <=38.0 U/mL   CEA    Collection Time: 07/02/24  2:12 PM   Result Value Ref Range    Carcinoembryonic Antigen 4.99 (H) 0.00 - 3.00 ng/mL   Comprehensive Metabolic Panel    Collection Time: 07/02/24  2:12 PM   Result Value Ref Range    Sodium 133 (L) 136 - 145 mmol/L    Potassium 4.5 3.5 - 5.1 mmol/L    Chloride 96 (L) 98 - 107 mmol/L    CO2 25 23 - 31 mmol/L    Glucose 445 (H) 82 - 115 mg/dL    Blood Urea Nitrogen 27.2 (H) 9.8 - 20.1 mg/dL    Creatinine 1.66 (H) 0.55 - 1.02 mg/dL    Calcium 9.7 8.4 - 10.2 mg/dL    Protein Total 7.2 5.8 - 7.6 gm/dL    Albumin 4.1 3.4 - 4.8 g/dL    Globulin 3.1 2.4 - 3.5 gm/dL    Albumin/Globulin Ratio 1.3 1.1 - 2.0 ratio    Bilirubin Total 0.5 <=1.5 mg/dL    ALP 65 40 - 150 unit/L    ALT 15 0 - 55 unit/L    AST 17 5 - 34 unit/L    eGFR 34 mL/min/1.73/m2    Anion Gap 12.0 mEq/L    BUN/Creatinine Ratio 16    CBC with Differential    Collection Time: 07/02/24  2:12 PM   Result Value Ref Range    WBC 10.33 4.50 - 11.50 x10(3)/mcL    RBC 4.24 4.20 - 5.40 x10(6)/mcL    Hgb 13.3 12.0 - 16.0 g/dL    Hct 41.3 37.0 - 47.0 %    MCV 97.4 (H) 80.0 - 94.0 fL    MCH 31.4 (H) 27.0 - 31.0 pg    MCHC 32.2 (L) 33.0 - 36.0 g/dL    RDW 13.3 11.5 - 17.0 %    Platelet 173 130 - 400 x10(3)/mcL    MPV 9.4 7.4 - 10.4 fL    Neut  % 75.5 %    Lymph % 13.1 %    Mono % 5.6 %    Eos % 5.0 %    Basophil % 0.6 %    Lymph # 1.35 0.6 - 4.6 x10(3)/mcL    Neut # 7.80 2.1 - 9.2 x10(3)/mcL    Mono # 0.58 0.1 - 1.3 x10(3)/mcL    Eos # 0.52 0 - 0.9 x10(3)/mcL    Baso # 0.06 <=0.2 x10(3)/mcL    IG# 0.02 0 - 0.04 x10(3)/mcL    IG% 0.2 %         Assessment:   Stage II breast cancer - DERICK  Osteopenia  History of iron deficiency anemia  Mildly elevated CEA level  Diabetes mellitus - non compliance    Plan:   Patient completed 7 years of endocrine therapy.  She is scheduled for a screening mammogram today.  Continue Prolia for treatment of bone density.  Repeat bone density exam 9/25.  Compliance with diabetes therapy was strongly recommended given her associated symptoms and laboratory abnormalities.    CEA is mildly elevated but not suspicious given intermittent fluctuations over 6 years.  RTC 6 months for follow-up with laboratory, including tumor markers, for reassurance.  All questions answered to the satisfaction of the patient.    PAKO MIRANDA, FNP-C  [facility] [place]     Other Physicians  Dr. Marin Diaz

## 2024-08-13 ENCOUNTER — PATIENT OUTREACH (OUTPATIENT)
Facility: CLINIC | Age: 64
End: 2024-08-13
Payer: COMMERCIAL

## 2024-08-13 DIAGNOSIS — Z79.4 TYPE 2 DIABETES MELLITUS WITH DIABETIC POLYNEUROPATHY, WITH LONG-TERM CURRENT USE OF INSULIN: Primary | ICD-10-CM

## 2024-08-13 DIAGNOSIS — E11.42 TYPE 2 DIABETES MELLITUS WITH DIABETIC POLYNEUROPATHY, WITH LONG-TERM CURRENT USE OF INSULIN: Primary | ICD-10-CM

## 2024-08-13 LAB
LEFT EYE DM RETINOPATHY: NEGATIVE
RIGHT EYE DM RETINOPATHY: NEGATIVE

## 2024-08-13 NOTE — LETTER
AUTHORIZATION FOR RELEASE OF CONFIDENTIAL INFORMATION      2024      Dear Dr. Landers,    We are seeing Dede Castillo, date of birth 1960, in the clinic at [facility].   Marin Mccoy MD is the patient's PCP. Dede Castillo has an outstanding lab/procedure at the time we reviewed his chart.  In order to help keep her health information updated, Dede has authorized us to request the following medical record(s):        Eye Exam - including evaluation for diabetic retinopathy             Please fax any records to Marin Mccoy MD's at  [phone]        If you have any questions you can contact Sophia Rolly at [phone].             Patient Name: Dede Castillo    : 1960    Patient Phone #: [phone]                                    Dede Castillo  MRN: [id number]  : 1960  Age: 63 y.o.  Sex: female         Patient/Legal Guardian Signature  This signature was collected at 2024    Dede Castillo       _______________________________   Printed Name/Relationship to Patient      Consent for Examination and Treatment: I hereby authorize the providers and employees of [facility] ([facility]) to provide medical treatment/services which includes, but is not limited to, performing and administering tests and diagnostic procedures that are deemed necessary, including, but not limited to, imaging examinations, blood tests and other laboratory procedures as may be required by the hospital, clinic, or may be ordered by my physician(s) or persons working under the general and/or special instructions of my physician(s).      I understand and agree that this consent covers all authorized persons, including but not limited to physicians, residents, nurse practitioners, physicians' assistants, specialists, consultants, student nurses, and independently contracted physicians, who are called upon by the physician in charge, to carry out the diagnostic procedures and  medical or surgical treatment.     I hereby authorize [facility] to retain or dispose of any specimens or tissue, should there be such remaining from any test or procedure.     I hereby authorize and give consent for [facility] providers and employees to take photographs, images or videotapes of such diagnostic, surgical or treatment procedures of Patient as may be required by [facility] or as may be ordered by a physician. I further acknowledge and agree that [facility] may use cameras or other devices for patient monitoring.     I am aware that the practice of medicine is not an exact science, and I acknowledge that no guarantees have been made to me as to the outcome of any tests, procedures or treatment.     Authorization for Release of Information: I understand that my insurance company and/or their agents may need information necessary to make determinations about payment/reimbursement. I hereby provide authorization to release to all insurance companies, their successors, assignees, other parties with whom they may have contracted, or others acting on their behalf, that are involved with payment for any hospital and/or clinic charges incurred by the patient, any information that they request and deem necessary for payment/reimbursement, and/or quality review.  I further authorize the release of my health information to physicians or other health care practitioners on staff who are involved in my health care now and in the future, and to other health care providers, entities, or institutions for the purpose of my continued care and treatment, including referrals.     REGISTRATION AUTHORIZATION  Form No. 14071 (Rev. 7/13/2022)       Medicare Patient's Certification and Authorization to Release Information and Payment Request:  I certify that the information given by me in applying for payment under Title XVIII of the Social Security Act is correct. I authorize any ferguson of medical or other information about me to release  to the [facility], or its intermediaries or carriers, any information needed for this or a related Medicare claim. I request that payment of authorized benefits be made on my behalf.     Assignment of Insurance Benefits:   I hereby authorize any and all insurance companies, health plans, defined   benefit plans, health insurers or any entity that is or may be responsible for payment of my medical expenses to pay all hospital and medical benefits now due, and to become due and payable to me under any hospital benefits, sick benefits, injury benefits or any other benefit for services rendered to me, including Major Medical Benefits, direct to [facility] and all independently contracted physicians. I assign any and all rights that I may have against any and all insurance companies, health plans, defined benefit plans, health insurers or any entity that is or may be responsible for payment of my medical expenses, including, but not limited to any right to appeal a denial of a claim, any right to bring any action, lawsuit, administrative proceeding, or other cause of action on my behalf. I specifically assign my right to pursue litigation against any and all insurance companies, health plans, defined benefit plans, health insurers or any entity that is or may be responsible for payment of my medical expenses based upon a refusal to pay charges.            E. Valuables: It is understood and agreed that [facility] is not liable for the damage to or loss of any money, jewelry,   documents, dentures, eye glasses, hearing aids, prosthetics, or other property of value.     F. Computer Equipment: I understand and agree that should I choose to use computer equipment owned by [facility] or if I choose to access the Internet via [facility]s network, I do so at my own risk. [facility] is not responsible for any damage to my computer equipment or to any damages of any type that might arise from my loss of equipment or data.      CJ. Acceptance of Financial Responsibility:  I agree that in consideration of the services and   supplies that have been   or will be furnished to the patient, I am hereby obligated to pay all charges made for or on the account of the patient according to the standard rates (in effect at the time the services and supplies are delivered) established by [facility], including its Patient Financial Assistance Policy to the extent it is applicable. I understand that I am responsible for all charges, or portions thereof, not covered by insurance or other sources. Patient refunds will be distributed only after balances at all [facility] facilities are paid.     H. Communication Authorization:  I hereby authorize [facility] and its representatives, along with any billing service   or  who may work on their behalf, to contact me on   my cell phone and/or home phone using pre- recorded messages, artificial voice messages, automatic telephone dialing devices or other computer assisted technology, or by electronic      mail, text messaging, or by any other form of electronic communication. This includes, but is not limited to, appointment reminders, yearly physical exam reminders, preventive care reminders, patient campaigns, welcome calls, and calls about account balances on my account or any account on which I am listed as a guarantor. I understand I have the right to opt out of these communications at any time.      Relationship  Between  Facility and  Provider:      I understand that some, but not all, providers furnishing services to the patient are not employees or agents of [facility]. The patient is under the care and supervision of his/her attending physician, and it is the responsibility of the facility and its nursing staff to carry out the instructions of such physicians. It is the responsibility of the patient's physician/designee to obtain the patient's informed consent, when required, for medical or surgical  treatment, special diagnostic or therapeutic procedures, or hospital services rendered for the patient under the special instructions of the physician/designee.     REGISTRATION AUTHORIZATION  Form No. 27305 (Rev. 7/13/2022)      Notice of Privacy Practices: I acknowledge I have received a copy of [facility]'s Notice of Privacy Practices.     Facility  Directory: I have discussed with the organization my desire to be either included or excluded  in the facility directory in the event of my being an inpatient at an [facility] facility. I understand that if my choice is to opt-out of being identified in the facility directory that the facility will not provide any information about me such as my condition (e.g. fair, stable, etc.) or my location in the facility (e.g., room number, department).     Immunizations: [facility] shares immunization information with state sponsored health departments to help you and your doctor keep track of your immunization records. By signing, you consent to have this information shared with the health department in your state:                                [place] - LINKS ([place] Immunization Network for Kids Statewide)                                [place] - MIIX ([place] Immunization Information eXchange)                                [place] - ImmPRINT (Immunization Patient Registry with Integrated Technology)     TERM: This authorization is valid for this and subsequent care/treatment I receive at [facility] and will remain valid unless/until revoked in writing by me.     [facility]: As used in this document, [facility] means all [facility] owned and managed facilities, including, but not limited to, all health centers, surgery centers, clinics, urgent care centers, and hospitals.         [facility] complies with applicable Federal civil rights laws and does not discriminate on the basis of race, color, national origin, age, disability, or sex.  ATENCIÓN:  si habla español, tiene a forde disposición servicios gratuitos de asistencia lingüística. Llame al [phone].  CHÚ Ý: N?u b?n nói Ti?ng Vi?t, có các d?ch v? h? tr? ngôn ng? mi?n phí dành cho b?n. G?i s? [phone].        REGISTRATION AUTHORIZATION  Form No. 92462 (Rev. 7/13/2022)

## 2024-08-13 NOTE — PROGRESS NOTES
Health Maintenance Topic(s) Outreach Outcomes & Actions Taken:    Eye Exam - Outreach Outcomes & Actions Taken  : Eye Exam Screening Order Placed and Eye Camera Scheduled or Optometry/Ophthalmology Referral Placed/Appt Scheduled       Additional Notes:  Patient called me due to KORY was sent to her portal. Patient reports she has not had a recent DM eye exam due to no longer having insurance. Patient agreed to In office exam during next visit. Order placed and patient scheduled.

## 2024-08-13 NOTE — PROGRESS NOTES
Records Received, hyper-linked into chart at this time. The following record(s)  below were uploaded for Health Maintenance .     2/21/2022 DM EYE EXAM

## 2024-08-13 NOTE — PROGRESS NOTES
Health Maintenance Topic(s) Outreach Outcomes & Actions Taken:    Eye Exam - Outreach Outcomes & Actions Taken  : External Records Requested & Care Team Updated if Applicable and KORY sent to Dr. Conrad Landers       Additional Notes:

## 2024-08-26 ENCOUNTER — TELEPHONE (OUTPATIENT)
Dept: PRIMARY CARE CLINIC | Facility: CLINIC | Age: 64
End: 2024-08-26
Payer: COMMERCIAL

## 2024-08-26 DIAGNOSIS — Z79.4 TYPE 2 DIABETES MELLITUS WITH DIABETIC POLYNEUROPATHY, WITH LONG-TERM CURRENT USE OF INSULIN: Primary | ICD-10-CM

## 2024-08-26 DIAGNOSIS — E11.42 TYPE 2 DIABETES MELLITUS WITH DIABETIC POLYNEUROPATHY, WITH LONG-TERM CURRENT USE OF INSULIN: ICD-10-CM

## 2024-08-26 DIAGNOSIS — Z79.4 TYPE 2 DIABETES MELLITUS WITH DIABETIC POLYNEUROPATHY, WITH LONG-TERM CURRENT USE OF INSULIN: ICD-10-CM

## 2024-08-26 DIAGNOSIS — E11.42 TYPE 2 DIABETES MELLITUS WITH DIABETIC POLYNEUROPATHY, WITH LONG-TERM CURRENT USE OF INSULIN: Primary | ICD-10-CM

## 2024-08-26 RX ORDER — PEN NEEDLE, DIABETIC 32GX 5/32"
1 NEEDLE, DISPOSABLE MISCELLANEOUS 3 TIMES DAILY PRN
Qty: 100 EACH | Refills: 2 | Status: SHIPPED | OUTPATIENT
Start: 2024-08-26

## 2024-08-26 RX ORDER — PEN NEEDLE, DIABETIC 32GX 5/32"
1 NEEDLE, DISPOSABLE MISCELLANEOUS 3 TIMES DAILY PRN
Qty: 100 EACH | Refills: 2 | Status: CANCELLED | OUTPATIENT
Start: 2024-08-26

## 2024-08-26 NOTE — TELEPHONE ENCOUNTER
----- Message from Marylin Justin sent at 8/26/2024  9:39 AM CDT -----  .Type:  RX Refill Request    Who Called: Maggie with [facility]  Refill or New Rx:REFILL  RX Name and Strength:Dexcomm G7  How is the patient currently taking it? (ex. 1XDay):NEW METER  Is this a 30 day or 90 day RX:90  Preferred Pharmacy with phone number:[facility]  Local or Mail Order:Mail Order  Ordering Provider:scotty  Would the patient rather a call back or a response via [facility]?   Best Call Back Number:[phone]  Additional Information: Dexcomm G7  fax [phone]

## 2024-09-09 ENCOUNTER — TELEPHONE (OUTPATIENT)
Dept: PRIMARY CARE CLINIC | Facility: CLINIC | Age: 64
End: 2024-09-09
Payer: MEDICARE

## 2024-09-09 ENCOUNTER — INFUSION (OUTPATIENT)
Dept: INFUSION THERAPY | Facility: HOSPITAL | Age: 64
End: 2024-09-09
Attending: FAMILY MEDICINE
Payer: MEDICARE

## 2024-09-09 VITALS — DIASTOLIC BLOOD PRESSURE: 68 MMHG | TEMPERATURE: 98 F | HEART RATE: 83 BPM | SYSTOLIC BLOOD PRESSURE: 104 MMHG

## 2024-09-09 DIAGNOSIS — M85.89 OSTEOPENIA OF MULTIPLE SITES: Primary | ICD-10-CM

## 2024-09-09 PROCEDURE — 63600175 PHARM REV CODE 636 W HCPCS: Mod: JZ,JG | Performed by: INTERNAL MEDICINE

## 2024-09-09 PROCEDURE — 96372 THER/PROPH/DIAG INJ SC/IM: CPT

## 2024-09-09 RX ADMIN — DENOSUMAB 60 MG: 60 INJECTION SUBCUTANEOUS at 01:09

## 2024-09-09 NOTE — TELEPHONE ENCOUNTER
----- Message from Real Haynes sent at 9/9/2024 12:22 PM CDT -----  Who Called: [facility]     Caller is requesting assistance/information from provider's office.    Symptoms (please be specific):    How long has patient had these symptoms:    List of preferred pharmacies on file (remove unneeded): [unfilled]  If different, enter pharmacy into here including location and phone number:       Patient's Preferred Phone Number on File: [phone]  Best Call Back Number, if different: # [phone]  Additional Information: called to follow up on Dexcom G7 req sent on 08/26 , please follow up

## 2024-09-17 ENCOUNTER — TELEPHONE (OUTPATIENT)
Dept: PRIMARY CARE CLINIC | Facility: CLINIC | Age: 64
End: 2024-09-17
Payer: MEDICARE

## 2024-09-17 DIAGNOSIS — K21.9 GASTROESOPHAGEAL REFLUX DISEASE, UNSPECIFIED WHETHER ESOPHAGITIS PRESENT: ICD-10-CM

## 2024-09-17 DIAGNOSIS — E78.00 HYPERCHOLESTEROLEMIA: Primary | ICD-10-CM

## 2024-09-17 DIAGNOSIS — M85.89 OSTEOPENIA OF MULTIPLE SITES: ICD-10-CM

## 2024-09-17 RX ORDER — ATORVASTATIN CALCIUM 80 MG/1
80 TABLET, FILM COATED ORAL DAILY
Qty: 90 TABLET | Refills: 3 | Status: SHIPPED | OUTPATIENT
Start: 2024-09-17 | End: 2025-09-17

## 2024-09-17 RX ORDER — PANTOPRAZOLE SODIUM 40 MG/1
40 TABLET, DELAYED RELEASE ORAL DAILY
Qty: 90 TABLET | Refills: 3 | Status: SHIPPED | OUTPATIENT
Start: 2024-09-17

## 2024-09-17 RX ORDER — GABAPENTIN 300 MG/1
300 CAPSULE ORAL 2 TIMES DAILY
Qty: 180 CAPSULE | Refills: 3 | Status: SHIPPED | OUTPATIENT
Start: 2024-09-17 | End: 2025-09-17

## 2024-09-17 NOTE — TELEPHONE ENCOUNTER
----- Message from Rai Flowers sent at 9/17/2024  9:14 AM CDT -----  .Type:  RX Refill Request    Who Called: Kaitlynn from [facility]   Refill or New Rx:refill   RX Name and Strength:atorvastatin (LIPITOR) 80 MG tablet  How is the patient currently taking it? (ex. 1XDay):1x  Is this a 30 day or 90 day RX:90  Preferred Pharmacy with phone number: [facility] or Mail Order:mail   Ordering Provider:scotty   Would the patient rather a call back or a response via [facility]? Call back   Best Call Back Number:[phone]  Additional Information:     .Type:  RX Refill Request    Who Called: Kaitlynn from [facility]   Refill or New Rx:refill   RX Name and Strength:pantoprazole (PROTONIX) 40 MG tablet  How is the patient currently taking it? (ex. 1XDay):1x  Is this a 30 day or 90 day RX:30  Preferred Pharmacy with phone number: [facility] or Mail Order:mail   Ordering Provider:scotty   Would the patient rather a call back or a response via [facility]? Call back   Best Call Back Number:[phone]  Additional Information:     .Type:  RX Refill Request    Who Called: Kaitlynn from [facility]   Refill or New Rx:refill   RX Name and Strength:gabapentin (NEURONTIN) 300 MG capsule  How is the patient currently taking it? (ex. 1XDay):2x  Is this a 30 day or 90 day RX:30  Preferred Pharmacy with phone number: [facility] or Mail Order:mail   Ordering Provider:scotty   Would the patient rather a call back or a response via [facility]? Call back   Best Call Back Number:[phone]  Additional Information:

## 2024-09-17 NOTE — PROGRESS NOTES
"Follow-up       HPI:    Patient presents for 6 month recheck.  She has been feeling well.  She has been sleeping   Her appetite has been too good; she was out of Trulicity x 3 mo. She regained her weight. She has been back on her Trulicity x 3 weeks.      Current Outpatient Medications   Medication Instructions    atorvastatin (LIPITOR) 80 mg, Oral, Daily    BD DARÍO 2ND GEN PEN NEEDLE 32 gauge x 5/32" Ndle 1 pen , subcutaneous (via wearable injector), 3 times daily PRN, use as directed    cetirizine (ZYRTEC) 10 mg, Oral, Daily    cyclobenzaprine (FLEXERIL) 10 mg, Oral    DULoxetine (CYMBALTA) 60 mg, Oral, Daily    ergocalciferol (VITAMIN D2) 50,000 Units, Oral, Every 7 days    fluticasone propionate (FLONASE) 50 mcg/actuation nasal spray 1 spray, Each Nostril, 2 times daily PRN    gabapentin (NEURONTIN) 300 mg, Oral, 2 times daily    insulin degludec (TRESIBA FLEXTOUCH U-200) 200 unit/mL (3 mL) insulin pen INJECT UNDER THE SKIN BETWEEN 60-80 UNITS PER SLIDING SCALE (OR 70 UNITS) ONCE DAILY AS DIRECTED    magnesium oxide (MAG-OX) 400 mg, Oral, Daily    naproxen (NAPROSYN) 500 mg, Oral, 2 times daily    ondansetron (ZOFRAN-ODT) 4 mg, Oral, Every 6 hours PRN    ONETOUCH VERIO TEST STRIPS Strp 2 times daily, Test    pantoprazole (PROTONIX) 40 mg, Oral, Daily    polycarbophil (FIBERCON) 625 mg tablet 2 tablets, Oral, 2 times daily    SYNJARDY XR 12.5-1,000 mg TBph 1 tablet, Oral, 2 times daily    topiramate (TOPAMAX) 50 mg, Oral, Daily, At bedtime    traMADoL (ULTRAM) 50 mg, Oral, Every 6 hours PRN    TRULICITY 0.75 mg, Subcutaneous, Every 7 days         ROS:    See HPI.      PE:    ..Visit Vitals  BP 97/65 (BP Location: Right arm, Patient Position: Sitting, BP Method: Large (Manual))   Pulse 91   Temp 97.7 °F (36.5 °C)   Resp 18   Ht 5' 4" (1.626 m)   Wt 73 kg (161 lb)   SpO2 97%   BMI 27.64 kg/m²        General:  She is a well-developed well-nourished obese white female in no apparent distress she is alert and " oriented.    Chest: Clear to auscultation bilaterally.    CV: Regular rate and rhythm without murmurs rubs or gallops.    Bilateral lower extremities:   Protective Sensation (w/ 10 gram monofilament):  Right: Intact  Left: Intact    Visual Inspection:  Normal -  Bilateral    Pedal Pulses:   Right: Present  Left: Present    Posterior Tibialis Pulses:   Right:Present  Left: Present         1. Type 2 diabetes mellitus with diabetic polyneuropathy, with long-term current use of insulin  Overview:  A1c, microalbumin and lipid profile pending.    Foot exam performed today; neuropathy noted.    Last eye exam 2022.  Continue ADA diet; limit intake of sugary foods and refined carbohydrates.  Continue Trulicity, SynJardy and Tresiba.    09/21/2023:  Labs 03/2023: A1c 6.4.  Microalbumin 30 milligrams/liter.  Foot exam performed today.  Continue ADA diet; limit intake of sugary foods and refined carbohydrates.    02/16/2024:  Refills for Trulicity sent.  Labs 09/2023:  A1c 6.2.  03/2023: Microalbumin 30 milligrams/liter.  Patient advised to have eyes examined.    03/22/2024:  A1c and microalbumin level pending.  Foot exam performed today.    Continue ADA diet; limit intake of sugary foods and refined carbohydrates.  A1c 6.7.  Microalbumin to creatinine ratio 8.2    09/23/2024: A1c pending.    Continue ADA diet; limit intake of sugary foods and refined carbohydrates.  Foot exam performed today.    Retinal images obtained in office today.  Patient was off Trulicity for 3 months; she has been back on it for 3 weeks.      Orders:  -     Hemoglobin A1C; Future; Expected date: 09/23/2024    2. Chronic kidney disease, stage 3a  Overview:  Labs 3/08/2024: BUN 31.9/creatinine 1.34.   Drink plenty of water.    Avoid NSAIDs.      09/17/2024:  Creatinine 07/02/2024:  1.66, GFR 34.  Patient encouraged to drink plenty of water.    Avoid NSAIDs.  Patient has repeat CMP with oncology in 3 months.        3. Migraine without aura and without  status migrainosus, not intractable  Overview:  Her headaches are doing much better; they are decreased in frequency and intensity.    Continue Topamax once daily; refills sent.    03/22/2024:  Her headaches are doing much better; they are decreased in frequency and intensity.    Continue Topamax once daily; refills sent.    09/23/2024: Patient states her headaches have been well controlled.  Her headaches have become quite infrequent.  Continue Topamax.      4. Anxiety  Overview:  Patient states her anxiety has been doing well; continue duloxetine.  Refills sent.  She denies any panic attacks, sadness or depression.    03/22/2024:  Patient states her anxiety has been doing well; continue duloxetine.  Refills sent.  She denies any panic attacks, sadness or depression.    09/23/2024:  Patient states her anxiety has been doing well; continue duloxetine.  Refills sent.  She denies any panic attacks, sadness or depression.      5. Mild intermittent asthma without complication  Overview:  Patient denies having any problems with her asthma for some time.    09/23/2024: Patient denies having any problems with her asthma for some time.      6. Malignant neoplasm of upper-outer quadrant of right breast in female, estrogen receptor positive  Overview:  Patient with history of breast cancer diagnosed in 2016.    She is currently on Femara.  Followed by Dr. Wang; last saw a nurse practitioner in February.  CBC and CMP done at that time.    09/23/2024:  Labs 07/02/2024: CEA 4.99, CA 27.29: 13.1.  Followed by Dr. Wang; last office visit July.  Repeat labs in 3 months.      7. Iron deficiency anemia, unspecified iron deficiency anemia type  Overview:  Followed by Dr. Wang.  Last hemoglobin and hematocrit 12.3 and 40.8.    03/22/2024:  CBC 3 08/2024:  Hemoglobin/hematocrit: 12.2/38.9.    09/17/2024:  Hemoglobin/hematocrit 07/02/2024:  13.3/41.3.      8. Gastroesophageal reflux disease, unspecified whether esophagitis  present  Overview:  Controlled with Protonix; refills sent.    03/22/2024:  Controlled with Protonix; refills sent.    09/23/2024:  Her reflux is well controlled on Protonix.      9. Immunization due  Overview:  09/23/2024: High-dose flu 0.5 IM administered; benefits, risks and side effects discussed with patient.    Orders:  -     influenza (High-Dose) (Fluzone High-Dose) 180 mcg/0.5 mL IM vaccine (> or = 66 yo) 0.5 mL              ..Follow up in about 6 months (around 3/23/2025) for Follow Up, With labwork prior to visit.       Future Appointments   Date Time Provider Department Center   1/9/2025  1:00 PM LAB, [facility] LAB [facility]   1/16/2025  9:30 AM [facility]   3/10/2025  1:30 PM INJECTION CHAIR 03, [facility] CHEMOTHERAPY INFUSION [facility] CHEMO [facility]   3/24/2025 10:00 AM [facility]

## 2024-09-18 DIAGNOSIS — E11.9 TYPE 2 DIABETES MELLITUS WITHOUT COMPLICATION, WITH LONG-TERM CURRENT USE OF INSULIN: ICD-10-CM

## 2024-09-18 DIAGNOSIS — Z79.4 TYPE 2 DIABETES MELLITUS WITHOUT COMPLICATION, WITH LONG-TERM CURRENT USE OF INSULIN: ICD-10-CM

## 2024-09-18 RX ORDER — DULAGLUTIDE 0.75 MG/.5ML
0.75 INJECTION, SOLUTION SUBCUTANEOUS
Qty: 12 PEN | Refills: 1 | Status: SHIPPED | OUTPATIENT
Start: 2024-09-18 | End: 2025-03-17

## 2024-09-23 ENCOUNTER — CLINICAL SUPPORT (OUTPATIENT)
Dept: PRIMARY CARE CLINIC | Facility: CLINIC | Age: 64
End: 2024-09-23
Attending: FAMILY MEDICINE
Payer: MEDICARE

## 2024-09-23 ENCOUNTER — OFFICE VISIT (OUTPATIENT)
Dept: PRIMARY CARE CLINIC | Facility: CLINIC | Age: 64
End: 2024-09-23
Payer: MEDICARE

## 2024-09-23 VITALS
HEART RATE: 91 BPM | RESPIRATION RATE: 18 BRPM | OXYGEN SATURATION: 97 % | TEMPERATURE: 98 F | WEIGHT: 161 LBS | HEIGHT: 64 IN | BODY MASS INDEX: 27.49 KG/M2 | SYSTOLIC BLOOD PRESSURE: 97 MMHG | DIASTOLIC BLOOD PRESSURE: 65 MMHG

## 2024-09-23 DIAGNOSIS — K21.9 GASTROESOPHAGEAL REFLUX DISEASE, UNSPECIFIED WHETHER ESOPHAGITIS PRESENT: ICD-10-CM

## 2024-09-23 DIAGNOSIS — Z79.4 TYPE 2 DIABETES MELLITUS WITH DIABETIC POLYNEUROPATHY, WITH LONG-TERM CURRENT USE OF INSULIN: ICD-10-CM

## 2024-09-23 DIAGNOSIS — E11.42 TYPE 2 DIABETES MELLITUS WITH DIABETIC POLYNEUROPATHY, WITH LONG-TERM CURRENT USE OF INSULIN: ICD-10-CM

## 2024-09-23 DIAGNOSIS — E11.42 TYPE 2 DIABETES MELLITUS WITH DIABETIC POLYNEUROPATHY, WITH LONG-TERM CURRENT USE OF INSULIN: Primary | ICD-10-CM

## 2024-09-23 DIAGNOSIS — Z23 IMMUNIZATION DUE: ICD-10-CM

## 2024-09-23 DIAGNOSIS — Z17.0 MALIGNANT NEOPLASM OF UPPER-OUTER QUADRANT OF RIGHT BREAST IN FEMALE, ESTROGEN RECEPTOR POSITIVE: ICD-10-CM

## 2024-09-23 DIAGNOSIS — C50.411 MALIGNANT NEOPLASM OF UPPER-OUTER QUADRANT OF RIGHT BREAST IN FEMALE, ESTROGEN RECEPTOR POSITIVE: ICD-10-CM

## 2024-09-23 DIAGNOSIS — G43.009 MIGRAINE WITHOUT AURA AND WITHOUT STATUS MIGRAINOSUS, NOT INTRACTABLE: ICD-10-CM

## 2024-09-23 DIAGNOSIS — Z79.4 TYPE 2 DIABETES MELLITUS WITH DIABETIC POLYNEUROPATHY, WITH LONG-TERM CURRENT USE OF INSULIN: Primary | ICD-10-CM

## 2024-09-23 DIAGNOSIS — N18.31 CHRONIC KIDNEY DISEASE, STAGE 3A: ICD-10-CM

## 2024-09-23 DIAGNOSIS — F41.9 ANXIETY: ICD-10-CM

## 2024-09-23 DIAGNOSIS — J45.20 MILD INTERMITTENT ASTHMA WITHOUT COMPLICATION: ICD-10-CM

## 2024-09-23 DIAGNOSIS — D50.9 IRON DEFICIENCY ANEMIA, UNSPECIFIED IRON DEFICIENCY ANEMIA TYPE: ICD-10-CM

## 2024-09-23 PROCEDURE — 90653 IIV ADJUVANT VACCINE IM: CPT | Mod: ,,, | Performed by: FAMILY MEDICINE

## 2024-09-23 PROCEDURE — 99215 OFFICE O/P EST HI 40 MIN: CPT | Mod: ,,, | Performed by: FAMILY MEDICINE

## 2024-09-23 PROCEDURE — G0008 ADMIN INFLUENZA VIRUS VAC: HCPCS | Mod: ,,, | Performed by: FAMILY MEDICINE

## 2024-10-07 ENCOUNTER — LAB VISIT (OUTPATIENT)
Dept: LAB | Facility: HOSPITAL | Age: 64
End: 2024-10-07
Attending: FAMILY MEDICINE
Payer: MEDICARE

## 2024-10-07 DIAGNOSIS — E11.42 TYPE 2 DIABETES MELLITUS WITH DIABETIC POLYNEUROPATHY, WITH LONG-TERM CURRENT USE OF INSULIN: ICD-10-CM

## 2024-10-07 DIAGNOSIS — Z79.4 TYPE 2 DIABETES MELLITUS WITH DIABETIC POLYNEUROPATHY, WITH LONG-TERM CURRENT USE OF INSULIN: ICD-10-CM

## 2024-10-07 LAB
EST. AVERAGE GLUCOSE BLD GHB EST-MCNC: 177.2 MG/DL
HBA1C MFR BLD: 7.8 %

## 2024-10-07 PROCEDURE — 83036 HEMOGLOBIN GLYCOSYLATED A1C: CPT

## 2024-10-07 PROCEDURE — 36415 COLL VENOUS BLD VENIPUNCTURE: CPT

## 2024-10-08 DIAGNOSIS — E11.3393 MODERATE NONPROLIFERATIVE DIABETIC RETINOPATHY OF BOTH EYES ASSOCIATED WITH TYPE 2 DIABETES MELLITUS, MACULAR EDEMA PRESENCE UNSPECIFIED: Primary | ICD-10-CM

## 2024-10-08 DIAGNOSIS — E11.42 TYPE 2 DIABETES MELLITUS WITH DIABETIC POLYNEUROPATHY, WITH LONG-TERM CURRENT USE OF INSULIN: Primary | ICD-10-CM

## 2024-10-08 DIAGNOSIS — Z79.4 TYPE 2 DIABETES MELLITUS WITH DIABETIC POLYNEUROPATHY, WITH LONG-TERM CURRENT USE OF INSULIN: Primary | ICD-10-CM

## 2024-10-15 ENCOUNTER — OFFICE VISIT (OUTPATIENT)
Dept: URGENT CARE | Facility: CLINIC | Age: 64
End: 2024-10-15
Payer: MEDICARE

## 2024-10-15 VITALS
WEIGHT: 161 LBS | HEART RATE: 100 BPM | OXYGEN SATURATION: 99 % | TEMPERATURE: 98 F | HEIGHT: 64 IN | DIASTOLIC BLOOD PRESSURE: 68 MMHG | SYSTOLIC BLOOD PRESSURE: 86 MMHG | BODY MASS INDEX: 27.49 KG/M2

## 2024-10-15 DIAGNOSIS — M25.561 ACUTE PAIN OF RIGHT KNEE: ICD-10-CM

## 2024-10-15 DIAGNOSIS — M79.641 PAIN OF RIGHT HAND: ICD-10-CM
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
## 2024-10-15 DIAGNOSIS — T07.XXXA ABRASIONS OF MULTIPLE SITES: ICD-10-CM

## 2024-10-15 RX ORDER — MUPIROCIN 20 MG/G
OINTMENT TOPICAL 3 TIMES DAILY
Qty: 15 G | Refills: 0 | Status: SHIPPED | OUTPATIENT
Start: 2024-10-15 | End: 2024-10-22

## 2024-10-15 NOTE — PATIENT INSTRUCTIONS
Tetanus vaccination updated today.  High concern high concern for hand and knee  contusions.  Keep wounds/abrasions clean with soap and water then apply neosporin or triple antibiotic ointment with bandage changed 2-3 times daily.  Recommend alternate Tylenol and ibuprofen 6- 8 hours for pain and inflammation.  recommend apply ice pack to help reduce to help reduce swelling.      Recommend follow up primary care physician or orthopedist in 1-2 weeks if not improving.

## 2024-10-15 NOTE — PROGRESS NOTES
"Subjective:      Patient ID: Dede Castillo is a 64 y.o. female.    Vitals:  height is 5' 4" (1.626 m) and weight is 73 kg (161 lb). Her tympanic temperature is 98.1 °F (36.7 °C). Her blood pressure is 86/68 (abnormal) and her pulse is 100. Her oxygen saturation is 99%.     Chief Complaint: Fall    Right-hand dominant female with mechanical trip and fall walking up ramp at store today striking bilateral hands and knees on cement with superficial abrasions having right-hand right-hand in right knee pain driving self to care urgent care initial evaluation evaluation.  No LOC.    Fall  Incident onset: 1 hour ago. The fall occurred while walking. She landed on [place]. Point of impact: hands and knees. The pain is present in the right hand and right knee. Pertinent negatives include no headaches.     Constitution: Negative for generalized weakness.   HENT:  Negative for facial swelling and facial trauma.    Neck: Negative for neck pain.   Cardiovascular:  Negative for chest trauma and passing out.   Gastrointestinal:  Negative for abdominal trauma.   Musculoskeletal:  Positive for trauma, joint pain and joint swelling. Negative for abnormal ROM of joint and back pain.   Skin:  Positive for abrasion. Negative for laceration.   Neurological:  Negative for dizziness, light-headedness, passing out and headaches.   Hematologic/Lymphatic: Negative.       Objective:     Physical Exam   Constitutional: She is oriented to person, place, and time. She appears well-developed. She is cooperative.      Comments:Awake alert pleasant ambulatory female     HENT:   Head: Normocephalic and atraumatic.   Nose: Nose normal.   Mouth/Throat: Oropharynx is clear and moist and mucous membranes are normal.   Eyes: Conjunctivae and lids are normal. Pupils are equal, round, and reactive to light.   Neck: Trachea normal and phonation normal. Neck supple.   Cardiovascular: Normal rate, regular rhythm and normal pulses.   Pulmonary/Chest: Effort " normal and breath sounds normal.   Abdominal: Soft. There is no abdominal tenderness.   Musculoskeletal: Normal range of motion.         General: Swelling, tenderness and signs of injury present. No deformity. Normal range of motion.      Right wrist: Normal.      Right hip: Normal.      Left hip: Normal.      Right knee: She exhibits swelling. She exhibits normal range of motion and no ecchymosis. Tenderness found.      Left knee: She exhibits normal range of motion, no ecchymosis, no laceration and no bony tenderness. No tenderness found.      Right ankle: Normal.      Left ankle: Normal.      Cervical back: She exhibits no tenderness and no bony tenderness.      Thoracic back: She exhibits no tenderness and no bony tenderness.      Lumbar back: She exhibits no tenderness and no bony tenderness.        Arms:       Right hand: She exhibits tenderness and swelling. She exhibits normal range of motion, normal two-point discrimination, normal capillary refill and no laceration. Normal sensation noted. Normal strength noted.      Left hand: Normal.      Right upper leg: Normal.      Left upper leg: Normal.      Right lower leg: Normal.      Left lower leg: Normal.        Legs:       Right foot: Normal.      Left foot: Normal.   Neurological: no focal deficit. She is alert and oriented to person, place, and time. She has normal strength and normal reflexes. She displays no weakness. No sensory deficit. Gait normal.   Skin: Skin is warm, dry, intact and not diaphoretic. not left knee  Psychiatric: Her speech is normal and behavior is normal. Judgment and thought content normal.   Nursing note and vitals reviewed.       Previous History      Review of patient's allergies indicates:   Allergen Reactions    Baclofen Shortness Of Breath    Morphine Shortness Of Breath    Toradol [ketorolac] Shortness Of Breath    Celery     Midazolam     Morpholine analogues     Pineapple     Sulfa (sulfonamide antibiotics)        Past  "Medical History:   Diagnosis Date    Anxiety     GERD (gastroesophageal reflux disease)     Hypercholesterolemia     Mitral valve prolapse      Current Outpatient Medications   Medication Instructions    atorvastatin (LIPITOR) 80 mg, Oral, Daily    BD DARÍO 2ND GEN PEN NEEDLE 32 gauge x 5/32" Ndle 1 pen , subcutaneous (via wearable injector), 3 times daily PRN, use as directed    cetirizine (ZYRTEC) 10 mg, Daily    cyclobenzaprine (FLEXERIL) 10 mg    DULoxetine (CYMBALTA) 60 mg, Oral, Daily    ergocalciferol (VITAMIN D2) 50,000 Units, Every 7 days    fluticasone propionate (FLONASE) 50 mcg/actuation nasal spray 1 spray, 2 times daily PRN    gabapentin (NEURONTIN) 300 mg, Oral, 2 times daily    insulin degludec (TRESIBA FLEXTOUCH U-200) 200 unit/mL (3 mL) insulin pen INJECT UNDER THE SKIN BETWEEN 60-80 UNITS PER SLIDING SCALE (OR 70 UNITS) ONCE DAILY AS DIRECTED    magnesium oxide (MAG-OX) 400 mg, Daily    mupirocin (BACTROBAN) 2 % ointment Topical (Top), 3 times daily    naproxen (NAPROSYN) 500 mg, Oral, 2 times daily    ondansetron (ZOFRAN-ODT) 4 mg, Every 6 hours PRN    ONETOUCH VERIO TEST STRIPS Strp 2 times daily    pantoprazole (PROTONIX) 40 mg, Oral, Daily    polycarbophil (FIBERCON) 625 mg tablet 2 tablets, 2 times daily    SYNJARDY XR 12.5-1,000 mg TBph 1 tablet, Oral, 2 times daily    topiramate (TOPAMAX) 50 mg, Oral, Daily, At bedtime    traMADoL (ULTRAM) 50 mg, Oral, Every 6 hours PRN    TRULICITY 0.75 mg, Subcutaneous, Every 7 days     Past Surgical History:   Procedure Laterality Date    APPENDECTOMY      BACK SURGERY  11/17/2021    BREAST LUMPECTOMY Right     CHOLECYSTECTOMY      EYE SURGERY Right     FRACTURE SURGERY  2017    Fractured vertebrae back    HYSTERECTOMY Bilateral 02/02/2011    OOPHORECTOMY Bilateral 02/02/2011     Family History   Problem Relation Name Age of Onset    Heart disease Mother Suzy Thomas     Diabetes type II Mother Suzy Thomas     Heart disease Father Kobi Thomas     COPD " "Father Kobi Thomas     Uterine cancer Sister      Aneurysm Sister      No Known Problems Brother          MVA    No Known Problems Brother          MVA    Depression Brother Anna Marie Thomas         suicide    Chronic back pain Brother      Ulcers Brother      Asthma Daughter Courtney Honaker     Asthma Daughter Kalani Mistric     Breast cancer Other self 56        right breast cancer    Heart disease Sister Harper Thomas Aguilera     Heart disease Sister Emma Thomas        Social History     Tobacco Use    Smoking status: Never    Smokeless tobacco: Never   Substance Use Topics    Alcohol use: Not Currently     Comment: Occasionally    Drug use: Not Currently     Types: Amphetamines        Physical Exam      Vital Signs Reviewed   BP (!) 86/68   Pulse 100   Temp 98.1 °F (36.7 °C) (Tympanic)   Ht 5' 4" (1.626 m)   Wt 73 kg (161 lb)   SpO2 99%   BMI 27.64 kg/m²        Procedures    Procedures     Labs     Results for orders placed or performed in visit on 10/07/24   Hemoglobin A1C    Collection Time: 10/07/24  2:49 PM   Result Value Ref Range    Hemoglobin A1c 7.8 (H) <=7.0 %    Estimated Average Glucose 177.2 mg/dL         Assessment:     1. Fall, initial encounter    2. Pain of right hand    3. Acute pain of right knee    4. Abrasions of multiple sites        Plan:   Patient A&O4 neurologic intact intact with chronic hypotension denies lightheadedness or dizzness.  Abrasions cleaned with ointment and bandage application in clinic today.  Pt understands wet read xray no gross fracture and pending radiologist final report.  Pt understands dc plan with OTC and topical wound abrasion care and follow up recommendations.    Tetanus vaccination updated today.  High concern high concern for hand and knee  contusions.  Keep wounds/abrasions clean with soap and water then apply neosporin or triple antibiotic ointment with bandage changed 2-3 times daily.  Recommend alternate Tylenol and ibuprofen 6- 8 hours for pain and " inflammation.  recommend apply ice pack to help reduce to help reduce swelling.      Recommend follow up primary care physician or orthopedist in 1-2 weeks if not improving.    Fall, initial encounter  -     XR HAND COMPLETE 3 VIEW RIGHT; Future; Expected date: 10/15/2024  -     XR KNEE 3 VIEW RIGHT; Future; Expected date: 10/15/2024    Pain of right hand  -     XR HAND COMPLETE 3 VIEW RIGHT; Future; Expected date: 10/15/2024    Acute pain of right knee  -     XR KNEE 3 VIEW RIGHT; Future; Expected date: 10/15/2024    Abrasions of multiple sites    Other orders  -     Tdap (BOOSTRIX) vaccine injection 0.5 mL  -     mupirocin (BACTROBAN) 2 % ointment; Apply topically 3 (three) times daily. for 7 days  Dispense: 15 g; Refill: 0

## 2024-11-07 LAB
LEFT EYE DM RETINOPATHY: NEGATIVE
RIGHT EYE DM RETINOPATHY: NEGATIVE

## 2024-11-11 ENCOUNTER — PATIENT OUTREACH (OUTPATIENT)
Dept: ADMINISTRATIVE | Facility: HOSPITAL | Age: 64
End: 2024-11-11
Payer: MEDICARE

## 2024-11-11 NOTE — PROGRESS NOTES
External diabetic eye exam  report received, uploaded to chart and hyper-linked in

## 2024-11-12 DIAGNOSIS — R10.9 LEFT FLANK PAIN: Primary | ICD-10-CM

## 2024-11-12 NOTE — PROGRESS NOTES
"Flank Pain (Lower back pain left side  x 4 days)       HPI:    Patient presents with left lower back/flank pain for months; it has been worse over the past 4 days.  She denies any injuries accidents or trauma.  Her pain has been worse since Saturday.  It is continuous and aching in nature.  She denies any radicular leg pain.  She denies any dysuria, frequency or hematuria.  She has a occasional urgency.  She denies any fever or chills.  She has a occasional nausea with increased pain.  Her bowel movements are regular.  She denies any melena or hematochezia.  Her discomfort is worse with prolonged standing or ambulating.    Current Outpatient Medications   Medication Instructions    atorvastatin (LIPITOR) 80 mg, Oral, Daily    BD DARÍO 2ND GEN PEN NEEDLE 32 gauge x 5/32" Ndle 1 pen , subcutaneous (via wearable injector), 3 times daily PRN, use as directed    cetirizine (ZYRTEC) 10 mg, Daily    cyclobenzaprine (FLEXERIL) 5 MG tablet Take 1 tablet by mouth once to twice a day for back pain.    DULoxetine (CYMBALTA) 60 mg, Oral, Daily    ergocalciferol (VITAMIN D2) 50,000 Units, Every 7 days    fluticasone propionate (FLONASE) 50 mcg/actuation nasal spray 1 spray, 2 times daily PRN    gabapentin (NEURONTIN) 300 mg, Oral, 2 times daily    insulin degludec (TRESIBA FLEXTOUCH U-200) 200 unit/mL (3 mL) insulin pen INJECT UNDER THE SKIN BETWEEN 60-80 UNITS PER SLIDING SCALE (OR 70 UNITS) ONCE DAILY AS DIRECTED    magnesium oxide (MAG-OX) 400 mg, Daily    naproxen (NAPROSYN) 500 mg, Oral, 2 times daily    ondansetron (ZOFRAN-ODT) 4 mg, Every 6 hours PRN    ONETOUCH VERIO TEST STRIPS Strp 2 times daily    pantoprazole (PROTONIX) 40 mg, Oral, Daily    polycarbophil (FIBERCON) 625 mg tablet 2 tablets, 2 times daily    SYNJARDY XR 12.5-1,000 mg TBph 1 tablet, Oral, 2 times daily    topiramate (TOPAMAX) 50 mg, Oral, Daily, At bedtime    traMADoL (ULTRAM) 50 mg, Oral, Every 6 hours PRN    TRULICITY 0.75 mg, Subcutaneous, Every 7 " "days         ROS:    See HPI.  She has lost an additional 10 lb.      PE:    ..Visit Vitals  BP 97/65   Pulse 86   Temp 98.7 °F (37.1 °C)   Ht 5' 4" (1.626 m)   Wt 68.7 kg (151 lb 6.4 oz)   SpO2 95%   BMI 25.99 kg/m²        General: She is well-developed well-nourished white female in no apparent distress.  She is alert and oriented.  She has loss of mass in her upper and lower extremities.  She can transfer without difficulty.  She has a normal gait.    Low back:  Normal in appearance.  She is tender over her mid lower lumbar spine.  She has tenderness and increased tonicity over left paraspinal muscles as well as left quadratus lumborum muscle.  She is also tender over SI joint and sciatic notch.  She has decreased flexion and extension at waist secondary to discomfort.  She has decreased lateral flexion bilaterally secondary to discomfort.  Negative straight leg raise bilaterally        1. Chronic left-sided low back pain without sciatica  Overview:  Patient states her back pain is doing better; it is still present but improved.  Continue light activity.    Heat/massage.    Call if symptoms persist or worsen.    11/13/2024: Patient presents with left lower back pain for months.    Orders:  -     Discontinue: traMADoL (ULTRAM) 50 mg tablet; Take 1 tablet (50 mg total) by mouth every 6 (six) hours as needed for Pain.  Dispense: 30 tablet; Refill: 2  -     traMADoL (ULTRAM) 50 mg tablet; Take 1 tablet (50 mg total) by mouth every 6 (six) hours as needed for Pain.  Dispense: 30 tablet; Refill: 2    2. Dorsalgia, unspecified  -     MRI Lumbar Spine Without Contrast; Future; Expected date: 11/13/2024    Other orders  -     Discontinue: traMADoL (ULTRAM) 50 mg tablet; Take 1 tablet (50 mg total) by mouth every 6 (six) hours as needed for Pain.  Dispense: 30 tablet; Refill: 2  -     cyclobenzaprine (FLEXERIL) 5 MG tablet; Take 1 tablet by mouth once to twice a day for back pain.  Dispense: 20 tablet; Refill: 1      "         ..No follow-ups on file.       Future Appointments   Date Time Provider Department Center   1/9/2025  1:00 [facility]   1/16/2025  9:30 AM [facility]   3/10/2025  1:30 PM INJECTION CHAIR 03, [facility] CHEMOTHERAPY INFUSION [facility] CHEMO [facility]   3/24/2025 10:00 AM Marin Mccoy MD [facility] [place]

## 2024-11-13 ENCOUNTER — LAB VISIT (OUTPATIENT)
Dept: LAB | Facility: HOSPITAL | Age: 64
End: 2024-11-13
Attending: FAMILY MEDICINE
Payer: MEDICARE

## 2024-11-13 ENCOUNTER — OFFICE VISIT (OUTPATIENT)
Dept: PRIMARY CARE CLINIC | Facility: CLINIC | Age: 64
End: 2024-11-13
Payer: MEDICARE

## 2024-11-13 VITALS
HEART RATE: 86 BPM | DIASTOLIC BLOOD PRESSURE: 65 MMHG | SYSTOLIC BLOOD PRESSURE: 97 MMHG | BODY MASS INDEX: 25.84 KG/M2 | OXYGEN SATURATION: 95 % | TEMPERATURE: 99 F | HEIGHT: 64 IN | WEIGHT: 151.38 LBS

## 2024-11-13 DIAGNOSIS — R10.9 LEFT FLANK PAIN: ICD-10-CM

## 2024-11-13 DIAGNOSIS — G89.29 CHRONIC LEFT-SIDED LOW BACK PAIN WITHOUT SCIATICA: Primary | ICD-10-CM

## 2024-11-13 DIAGNOSIS — M54.50 CHRONIC LEFT-SIDED LOW BACK PAIN WITHOUT SCIATICA: Primary | ICD-10-CM

## 2024-11-13 DIAGNOSIS — M54.9 DORSALGIA, UNSPECIFIED: ICD-10-CM

## 2024-11-13 LAB
BACTERIA #/AREA URNS AUTO: ABNORMAL /HPF
BILIRUB UR QL STRIP.AUTO: NEGATIVE
CLARITY UR: ABNORMAL
COLOR UR AUTO: ABNORMAL
GLUCOSE UR QL STRIP: ABNORMAL
HGB UR QL STRIP: NEGATIVE
KETONES UR QL STRIP: NEGATIVE
LEUKOCYTE ESTERASE UR QL STRIP: NEGATIVE
NITRITE UR QL STRIP: NEGATIVE
PH UR STRIP: 8 [PH]
PROT UR QL STRIP: NEGATIVE
RBC #/AREA URNS AUTO: ABNORMAL /HPF
SP GR UR STRIP.AUTO: 1.02 (ref 1–1.03)
SQUAMOUS #/AREA URNS LPF: ABNORMAL /HPF
UROBILINOGEN UR STRIP-ACNC: NORMAL
WBC #/AREA URNS AUTO: ABNORMAL /HPF

## 2024-11-13 PROCEDURE — 81015 MICROSCOPIC EXAM OF URINE: CPT

## 2024-11-13 RX ORDER — TRAMADOL HYDROCHLORIDE 50 MG/1
50 TABLET ORAL EVERY 6 HOURS PRN
Qty: 30 TABLET | Refills: 2 | Status: SHIPPED | OUTPATIENT
Start: 2024-11-13 | End: 2024-11-13 | Stop reason: SDUPTHER

## 2024-11-13 RX ORDER — TRAMADOL HYDROCHLORIDE 50 MG/1
50 TABLET ORAL EVERY 6 HOURS PRN
Qty: 30 TABLET | Refills: 2 | Status: SHIPPED | OUTPATIENT
Start: 2024-11-13

## 2024-11-13 RX ORDER — CYCLOBENZAPRINE HCL 5 MG
TABLET ORAL
Qty: 20 TABLET | Refills: 1 | Status: SHIPPED | OUTPATIENT
Start: 2024-11-13

## 2024-11-18 ENCOUNTER — HOSPITAL ENCOUNTER (OUTPATIENT)
Dept: RADIOLOGY | Facility: HOSPITAL | Age: 64
Discharge: HOME | End: 2024-11-18
Attending: FAMILY MEDICINE
Payer: MEDICARE

## 2024-11-18 DIAGNOSIS — M54.9 DORSALGIA, UNSPECIFIED: ICD-10-CM

## 2024-11-18 PROCEDURE — 72148 MRI LUMBAR SPINE W/O DYE: CPT | Mod: TC

## 2024-11-20 ENCOUNTER — PATIENT MESSAGE (OUTPATIENT)
Dept: PRIMARY CARE CLINIC | Facility: CLINIC | Age: 64
End: 2024-11-20
Payer: MEDICARE

## 2024-11-29 DIAGNOSIS — E78.00 HYPERCHOLESTEROLEMIA: ICD-10-CM

## 2024-12-02 RX ORDER — ATORVASTATIN CALCIUM 80 MG/1
80 TABLET, FILM COATED ORAL DAILY
Qty: 90 TABLET | Refills: 3 | Status: SHIPPED | OUTPATIENT
Start: 2024-12-02 | End: 2025-12-02

## 2024-12-03 ENCOUNTER — TELEPHONE (OUTPATIENT)
Dept: PRIMARY CARE CLINIC | Facility: CLINIC | Age: 64
End: 2024-12-03
Payer: MEDICARE

## 2024-12-03 DIAGNOSIS — Z79.4 TYPE 2 DIABETES MELLITUS WITH DIABETIC POLYNEUROPATHY, WITH LONG-TERM CURRENT USE OF INSULIN: ICD-10-CM

## 2024-12-03 DIAGNOSIS — E11.42 TYPE 2 DIABETES MELLITUS WITH DIABETIC POLYNEUROPATHY, WITH LONG-TERM CURRENT USE OF INSULIN: ICD-10-CM

## 2024-12-03 DIAGNOSIS — M85.89 OSTEOPENIA OF MULTIPLE SITES: ICD-10-CM

## 2024-12-03 RX ORDER — EMPAGLIFLOZIN, METFORMIN HYDROCHLORIDE 12.5; 1 MG/1; MG/1
1 TABLET, EXTENDED RELEASE ORAL 2 TIMES DAILY
Qty: 180 TABLET | Refills: 3 | Status: SHIPPED | OUTPATIENT
Start: 2024-12-03

## 2024-12-03 RX ORDER — GABAPENTIN 300 MG/1
300 CAPSULE ORAL 2 TIMES DAILY
Qty: 180 CAPSULE | Refills: 3 | Status: SHIPPED | OUTPATIENT
Start: 2024-12-03 | End: 2025-12-03

## 2024-12-03 NOTE — TELEPHONE ENCOUNTER
----- Message from Aubrie sent at 12/3/2024  9:33 AM CST -----  .Who Called: Dede A Mistric    Refill or New Rx:Refill  RX Name and Strength:gabapentin (NEURONTIN) 300 MG capsule  How is the patient currently taking it? (ex. 1XDay):2x day  Is this a 30 day or 90 day RX:180  Local or Mail Order:local  List of preferred pharmacies on file (remove unneeded): [unfilled]  If different Pharmacy is requested, enter Pharmacy information here including location and phone number: [facility]   Ordering Provider:Darius      Preferred Method of Contact: Phone Call  Patient's Preferred Phone Number on File: [phone]   Best Call Back Number, if different:  Additional Information: refills  .Who Called: Dede A Mistric    Refill or New Rx:Refill  RX Name and Strength:SYNJARDY XR 12.5-1,000 mg TBph  How is the patient currently taking it? (ex. 1XDay):2x day  Is this a 30 day or 90 day RX:180  Local or Mail Order:local  List of preferred pharmacies on file (remove unneeded): [email or web address]  If different Pharmacy is requested, enter Pharmacy information here including location and phone number: [facility]   Ordering Provider:Darius      Preferred Method of Contact: Phone Call  Patient's Preferred Phone Number on File: [phone]   Best Call Back Number, if different:  Additional Information: refills out of both for week

## 2025-01-09 DIAGNOSIS — Z17.0 MALIGNANT NEOPLASM OF UPPER-OUTER QUADRANT OF RIGHT BREAST IN FEMALE, ESTROGEN RECEPTOR POSITIVE: Primary | ICD-10-CM

## 2025-01-09 DIAGNOSIS — C50.411 MALIGNANT NEOPLASM OF UPPER-OUTER QUADRANT OF RIGHT BREAST IN FEMALE, ESTROGEN RECEPTOR POSITIVE: Primary | ICD-10-CM

## 2025-01-15 NOTE — PROGRESS NOTES
"Subjective:       Patient ID: Dede Castillo is a 64 y.o. female.    Chief Complaint:  "Participating in PT for back pain"    Diagnosis: Stage IIA right breast cancer 6/16 (T2 N0 M0), 2.1 cm, grade II, ER/MI +, HER-2 +                    Postmenopausal s/p hysterectomy/BSO                    Iron deficiency anemia    Current Treatment: Observation    Treatment History  TCH + Perjeta x 1 -->THP x 1 --> TCH x 3 ---> XRT ---> Herceptin x 1 yr (7/17)  IV Infed 7/22/20  Letrozole 4/17- 3/24    Clinical History: Patient presented with an abnormal screening mammogram 6/1/16 showing a suspicious mass in the upper outer quadrant of the right breast.  Lesion measured 2.9 x 1.3 cm by ultrasound.  Ultrasound-guided biopsy was positive for invasive carcinoma with ductal and lobular features.  She underwent a right lumpectomy and sentinel lymph node dissection 6/23/16.  Final pathology revealed a 2.1 cm moderately differentiated infiltrating ductal carcinoma.  4 sentinel lymph nodes were negative for involvement.  ER +77%, MI +51% and HER-2 positive for overexpression by IHC and FISH.  Ki-67 expression was 13% (borderline).  There was no family history of breast cancer.  She was seen for a Medical Oncology opinion 7/21/16.  Tratment was recommended with a regimen of TCH + Perjeta for 6 cycles followed by single agent Herceptin every 3 weeks to complete one year of therapy.  She underwent placement of a left chest wall Mediport for faciliation of chemotherapy 8/5/16.  Baseline echocardiogram 8/1/16 showed normal LV size and function with an EF of 55%.  Chemotherapy was initiated 8/5/16.    She had significant toxicities following her 1st cycle of treatment requiring dose adjustment in treatment modifications.  She completed 5 cycles of adjuvant chemotherapy with Taxotere. Treatment was discontinued at that time secondary to progressive toxicities including severe fatigue, anemia, progressive fluid retention, peripheral edema, " neuropathy and myalgias. Herceptin was continued as a single agent every 3 weeks. She she completed adjuvant radiation therapy  with moderate toxicities requiring 2 brief treatment breaks. Initiation of hormonal therapy was delayed secondary to significant depression symptoms. She lost her sister and had progressive symptoms following a trial of Lyrica for her neuropathy. Her antidepressant regimen was changed by her primary care physician. Follow-up echocardiogram 17 showed a normal ejection fraction of 55%. She was started on adjuvant hormonal therapy with Letrozole .  Bone density from 17 showed osteopenia of the lumbar spine with a T score of -1.7 and osteopenia of both femoral necks with T-scores of -0.9 on the right and -1.2 on the left.  She was initially placed on Actonel which was poorly tolerated.  Once insurance approval is obtained, she was started on Prolia every 6 months.    Patient suffered a fall  resulting in a T12 compression fracture.  She underwent a T12 kyphoplasty by Dr. Feng 11/15/17. Mediport catheter became infected and was removed . Her   suddenly from a massive heart attack .  She was treated with IV Infed 20 for iron deficiency anemia with full recovery of her counts. She had a Cologuard test 10/20/20 which was negative. She was unable to tolerate the prep/NPO status for a colonoscopy.     Annual screening mammogram 22 showed a focal asymmetry in the left breast at 12:00 p.m. She reported a history of trauma to that area several weeks prior to the mammogram that caused visible bruising.  Diagnostic mammogram and left breast ultrasound 22 showed an overall mixed echogenicity mass with indistinct margins at 12:00 p.m..  Biopsy showed organizing hemorrhage and focal fat necrosis.    Bone density exam 9/3/21:  Osteopenia of the lumbar spine with a T-score of -1.5, right hip -1.1 and left hip-1.2.  Findings were stable to improved  from her previous exam.    EGD 6/14/23 showed a benign intrinsic stricture at the GE junction which was dilated.      Colonoscopy 6/14/23 showed polyps in the cecum and ascending colon which were removed.  Follow-up exam was recommended in 5 years.    Interval History  Ms. Castillo is here today for  a six month breast cancer surveillance visit.  She is now on continuous glucose monitoring and also compliant with Trulicity.  She's lost 13 pounds since her last visit.  She is participating in PT for back pain.  She has fallen a few times.  She is receiving Prolia for treatment of osteopenia.  She is not experiencing any jaw pain or dental issues.  She denies any changes to her self breast exam.  Bilateral screening mammogram is due 7/2025 and bone density is due to be repeated 9/2025.  Laboratory 1/9/2025 showed stable to improved renal insufficiency and hyperglycemia.  LFTs and tumor markers were unremarkable.  Results were reviewed and discussed today with the patient.    Review of Systems   Constitutional:  Positive for fatigue. Negative for appetite change and fever.        Intentional weight loss   HENT:  Negative for mouth sores, sore throat and trouble swallowing.    Eyes: Negative.  Negative for visual disturbance.   Respiratory:  Negative for cough and shortness of breath.    Cardiovascular:  Negative for chest pain, palpitations and leg swelling.   Gastrointestinal:  Positive for constipation. Negative for abdominal distention, abdominal pain, blood in stool, change in bowel habit, diarrhea, nausea and vomiting.   Endocrine: Negative for polydipsia.   Genitourinary:  Negative for dysuria, frequency and urgency.   Musculoskeletal:  Positive for arthralgias and back pain.   Integumentary:  Negative for pallor, rash, breast mass and breast tenderness.   Neurological:  Positive for headaches (mild, occasional). Negative for dizziness, weakness and numbness.   Hematological:  Negative for adenopathy. Does not  "bruise/bleed easily.   Psychiatric/Behavioral: Negative.  The patient is not nervous/anxious.    Breast: Negative for mass and tenderness      PMHx:  Diabetes mellitus with LE neuropathy, asthma, hypercholesterolemia, MVP, GERD, anxiety, anemia, migraines, Menarche age 12, 1st pregnancy 15, + OCPs, hysterectomy/BSO 50, no HRT  PSHx:  Right lumpectomy, appendectomy, hysterectomy/BSO, cholecystectomy, right eye surgery age 4, cataracts  SH:  Lifetime nonsmoker, occasional alcohol use.  Lives in [place] with her daughter.    FH:  Her sister had uterine cancer.     Objective:        /76   Pulse 88   Temp 98.5 °F (36.9 °C)   Resp 15   Ht 5' 4" (1.626 m)   Wt 65.7 kg (144 lb 12.8 oz)   SpO2 97%   BMI 24.85 kg/m²    Physical Exam  Constitutional:       Comments: Well-developed white female in NAD   HENT:      Head: Normocephalic.      Mouth/Throat:      Mouth: Mucous membranes are moist.      Pharynx: Oropharynx is clear. No posterior oropharyngeal erythema.   Eyes:      Extraocular Movements: Extraocular movements intact.      Conjunctiva/sclera: Conjunctivae normal.      Pupils: Pupils are equal, round, and reactive to light.   Cardiovascular:      Rate and Rhythm: Normal rate and regular rhythm.      Heart sounds: No murmur heard.  Pulmonary:      Comments: Lungs clear to auscultation  Chest:      Comments: Large breast bilaterally.  Well-healed incisions right breast UOQ and axilla.  No suspicious masses, skin changes or axillary nodes bilaterally.  Abdominal:      General: Bowel sounds are normal. There is no distension.      Palpations: Abdomen is soft. There is no mass.      Tenderness: There is no abdominal tenderness.   Musculoskeletal:         General: No swelling or tenderness. Normal range of motion.      Cervical back: Neck supple. No tenderness.   Lymphadenopathy:      Cervical: No cervical adenopathy.      Upper Body:      Right upper body: No supraclavicular or axillary adenopathy.      Left " upper body: No supraclavicular or axillary adenopathy.   Skin:     General: Skin is warm and dry.      Findings: No rash.   Neurological:      General: No focal deficit present.      Mental Status: She is alert and oriented to person, place, and time.      Cranial Nerves: No cranial nerve deficit.      Motor: No weakness.       ECOG SCORE    1 - Restricted in strenuous activity-ambulatory and able to carry out work of a light nature          LABORATORY  Recent Results (from the past 2 weeks)   Cancer Antigen 27-29    Collection Time: 01/09/25 12:58 PM   Result Value Ref Range    Breast Carcinoma Assoc Ag(CA 27.29) <12.0 <=38.0 U/mL   CEA    Collection Time: 01/09/25 12:58 PM   Result Value Ref Range    Carcinoembryonic Antigen 3.27 (H) 0.00 - 3.00 ng/mL   Comprehensive Metabolic Panel    Collection Time: 01/09/25 12:58 PM   Result Value Ref Range    Sodium 138 136 - 145 mmol/L    Potassium 5.0 3.5 - 5.1 mmol/L    Chloride 102 98 - 107 mmol/L    CO2 26 23 - 31 mmol/L    Glucose 165 (H) 82 - 115 mg/dL    Blood Urea Nitrogen 20.8 (H) 9.8 - 20.1 mg/dL    Creatinine 1.21 (H) 0.55 - 1.02 mg/dL    Calcium 10.3 (H) 8.4 - 10.2 mg/dL    Protein Total 7.2 5.8 - 7.6 gm/dL    Albumin 3.9 3.4 - 4.8 g/dL    Globulin 3.3 2.4 - 3.5 gm/dL    Albumin/Globulin Ratio 1.2 1.1 - 2.0 ratio    Bilirubin Total 0.4 <=1.5 mg/dL    ALP 42 40 - 150 unit/L    ALT 14 0 - 55 unit/L    AST 19 5 - 34 unit/L    eGFR 50 mL/min/1.73/m2    Anion Gap 10.0 mEq/L    BUN/Creatinine Ratio 17    Hemoglobin A1C    Collection Time: 01/09/25 12:58 PM   Result Value Ref Range    Hemoglobin A1c 6.2 <=7.0 %    Estimated Average Glucose 131.2 mg/dL   CBC with Differential    Collection Time: 01/09/25 12:58 PM   Result Value Ref Range    WBC 7.05 4.50 - 11.50 x10(3)/mcL    RBC 3.90 (L) 4.20 - 5.40 x10(6)/mcL    Hgb 12.1 12.0 - 16.0 g/dL    Hct 38.7 37.0 - 47.0 %    MCV 99.2 (H) 80.0 - 94.0 fL    MCH 31.0 27.0 - 31.0 pg    MCHC 31.3 (L) 33.0 - 36.0 g/dL    RDW 14.7  11.5 - 17.0 %    Platelet 165 130 - 400 x10(3)/mcL    MPV 9.4 7.4 - 10.4 fL    Neut % 70.9 %    Lymph % 18.0 %    Mono % 5.8 %    Eos % 4.3 %    Basophil % 0.7 %    Imm Grans % 0.3 %    Neut # 5.00 2.1 - 9.2 x10(3)/mcL    Lymph # 1.27 0.6 - 4.6 x10(3)/mcL    Mono # 0.41 0.1 - 1.3 x10(3)/mcL    Eos # 0.30 0 - 0.9 x10(3)/mcL    Baso # 0.05 <=0.2 x10(3)/mcL    Imm Gran # 0.02 0.00 - 0.04 x10(3)/mcL    NRBC% 0.0 %         Assessment:   Stage II breast cancer - DERICK  Osteopenia  History of iron deficiency anemia    Plan:   Patient has no clinical or laboratory findings worrisome for disease recurrence or new problems.  She completed 7 years of adjuvant endocrine therapy with Letrozole and it was continued 3/2024.  She will continue Prolia every 6 months for treatment of her bone density.  No apparent dental issues.  Bilateral screening mammogram with tomosynthesis is due 7/2025.  Follow-up bone density exam is due 9/2025.  Follow-up after above for continued breast cancer surveillance.  CBC, CMP, CEA and CA 27-29 just prior to lab visit.  All laboratory results reviewed with patient.  Questions were answered to her satisfaction.    PAKO MIRANDA, FNP-C  Cancer Center [facility] [place]     Other Physicians  Dr. Marin Diaz

## 2025-01-16 ENCOUNTER — OFFICE VISIT (OUTPATIENT)
Dept: HEMATOLOGY/ONCOLOGY | Facility: CLINIC | Age: 65
End: 2025-01-16
Payer: MEDICARE

## 2025-01-16 VITALS
OXYGEN SATURATION: 97 % | BODY MASS INDEX: 24.72 KG/M2 | HEART RATE: 88 BPM | HEIGHT: 64 IN | DIASTOLIC BLOOD PRESSURE: 76 MMHG | RESPIRATION RATE: 15 BRPM | SYSTOLIC BLOOD PRESSURE: 108 MMHG | TEMPERATURE: 99 F | WEIGHT: 144.81 LBS

## 2025-01-16 DIAGNOSIS — Z12.31 SCREENING MAMMOGRAM FOR BREAST CANCER: ICD-10-CM

## 2025-01-16 DIAGNOSIS — Z85.3 PERSONAL HISTORY OF BREAST CANCER: Primary | ICD-10-CM

## 2025-01-16 DIAGNOSIS — M85.89 OSTEOPENIA OF MULTIPLE SITES: ICD-10-CM

## 2025-01-16 PROCEDURE — 99215 OFFICE O/P EST HI 40 MIN: CPT | Mod: PBBFAC | Performed by: NURSE PRACTITIONER

## 2025-01-16 PROCEDURE — 99999 PR PBB SHADOW E&M-EST. PATIENT-LVL V: CPT | Mod: PBBFAC,,, | Performed by: NURSE PRACTITIONER

## 2025-01-16 PROCEDURE — 99214 OFFICE O/P EST MOD 30 MIN: CPT | Mod: S$PBB,,, | Performed by: NURSE PRACTITIONER

## 2025-02-03 ENCOUNTER — TELEPHONE (OUTPATIENT)
Dept: PRIMARY CARE CLINIC | Facility: CLINIC | Age: 65
End: 2025-02-03
Payer: MEDICARE

## 2025-02-03 DIAGNOSIS — M85.89 OSTEOPENIA OF MULTIPLE SITES: ICD-10-CM

## 2025-02-03 DIAGNOSIS — E78.00 HYPERCHOLESTEROLEMIA: ICD-10-CM

## 2025-02-03 DIAGNOSIS — E11.9 TYPE 2 DIABETES MELLITUS WITHOUT COMPLICATION, WITH LONG-TERM CURRENT USE OF INSULIN: ICD-10-CM

## 2025-02-03 DIAGNOSIS — J45.20 MILD INTERMITTENT ASTHMA WITHOUT COMPLICATION: Primary | ICD-10-CM

## 2025-02-03 DIAGNOSIS — F41.9 ANXIETY: ICD-10-CM

## 2025-02-03 DIAGNOSIS — Z79.4 TYPE 2 DIABETES MELLITUS WITH DIABETIC POLYNEUROPATHY, WITH LONG-TERM CURRENT USE OF INSULIN: ICD-10-CM

## 2025-02-03 DIAGNOSIS — K21.9 GASTROESOPHAGEAL REFLUX DISEASE, UNSPECIFIED WHETHER ESOPHAGITIS PRESENT: ICD-10-CM

## 2025-02-03 DIAGNOSIS — Z79.4 TYPE 2 DIABETES MELLITUS WITHOUT COMPLICATION, WITH LONG-TERM CURRENT USE OF INSULIN: ICD-10-CM

## 2025-02-03 DIAGNOSIS — G43.911 INTRACTABLE MIGRAINE WITH STATUS MIGRAINOSUS, UNSPECIFIED MIGRAINE TYPE: ICD-10-CM

## 2025-02-03 DIAGNOSIS — E11.42 TYPE 2 DIABETES MELLITUS WITH DIABETIC POLYNEUROPATHY, WITH LONG-TERM CURRENT USE OF INSULIN: ICD-10-CM

## 2025-02-03 RX ORDER — DULOXETIN HYDROCHLORIDE 60 MG/1
60 CAPSULE, DELAYED RELEASE ORAL DAILY
Qty: 90 CAPSULE | Refills: 3 | Status: SHIPPED | OUTPATIENT
Start: 2025-02-03

## 2025-02-03 RX ORDER — FLUTICASONE PROPIONATE 50 MCG
1 SPRAY, SUSPENSION (ML) NASAL 2 TIMES DAILY PRN
Qty: 16 G | Refills: 3 | Status: SHIPPED | OUTPATIENT
Start: 2025-02-03

## 2025-02-03 RX ORDER — PEN NEEDLE, DIABETIC 32GX 5/32"
1 NEEDLE, DISPOSABLE MISCELLANEOUS 3 TIMES DAILY PRN
Qty: 100 EACH | Refills: 2 | Status: SHIPPED | OUTPATIENT
Start: 2025-02-03

## 2025-02-03 RX ORDER — INSULIN DEGLUDEC 200 U/ML
INJECTION, SOLUTION SUBCUTANEOUS
Qty: 27 PEN | Refills: 3 | Status: SHIPPED | OUTPATIENT
Start: 2025-02-03

## 2025-02-03 RX ORDER — TOPIRAMATE 50 MG/1
50 TABLET, FILM COATED ORAL DAILY
Qty: 90 TABLET | Refills: 3 | Status: SHIPPED | OUTPATIENT
Start: 2025-02-03 | End: 2026-02-03

## 2025-02-03 RX ORDER — GABAPENTIN 300 MG/1
300 CAPSULE ORAL 2 TIMES DAILY
Qty: 180 CAPSULE | Refills: 3 | Status: SHIPPED | OUTPATIENT
Start: 2025-02-03 | End: 2026-02-03

## 2025-02-03 RX ORDER — EMPAGLIFLOZIN, METFORMIN HYDROCHLORIDE 12.5; 1 MG/1; MG/1
1 TABLET, EXTENDED RELEASE ORAL 2 TIMES DAILY
Qty: 180 TABLET | Refills: 3 | Status: SHIPPED | OUTPATIENT
Start: 2025-02-03

## 2025-02-03 RX ORDER — PANTOPRAZOLE SODIUM 40 MG/1
40 TABLET, DELAYED RELEASE ORAL DAILY
Qty: 90 TABLET | Refills: 3 | Status: SHIPPED | OUTPATIENT
Start: 2025-02-03

## 2025-02-03 RX ORDER — ATORVASTATIN CALCIUM 80 MG/1
80 TABLET, FILM COATED ORAL DAILY
Qty: 90 TABLET | Refills: 3 | Status: SHIPPED | OUTPATIENT
Start: 2025-02-03 | End: 2026-02-03

## 2025-02-03 RX ORDER — DULAGLUTIDE 0.75 MG/.5ML
0.75 INJECTION, SOLUTION SUBCUTANEOUS
Qty: 12 PEN | Refills: 3 | Status: SHIPPED | OUTPATIENT
Start: 2025-02-03

## 2025-02-03 NOTE — TELEPHONE ENCOUNTER
"----- Message from Xochitl sent at 2/3/2025 10:46 AM CST -----  Who Called: Dede HERNANDEZ Mistric    Refill or New Rx:Refill  RX Name and Strength:pantoprazole (PROTONIX) 40 MG tablet   How is the patient currently taking it? (ex. 1XDay):1 xday  Is this a 30 day or 90 day RX:90 tablet    RX Name and Strength:atorvastatin (LIPITOR) 80 MG tablet   How is the patient currently taking it? (ex. 1XDay):1 xday  Is this a 30 day or 90 day RX:90 tablet    RX Name and Strength:SYNJARDY XR 12.5-1,000 mg TBph   How is the patient currently taking it? (ex. 1XDay):2 xday  Is this a 30 day or 90 day RX:180 tablet    RX Name and Strength:gabapentin (NEURONTIN) 300 MG capsule   How is the patient currently taking it? (ex. 1XDay):2 xday  Is this a 30 day or 90 day RX:180 capsule    RX Name and Strength:topiramate (TOPAMAX) 50 MG tablet   How is the patient currently taking it? (ex. 1XDay):1 xday  Is this a 30 day or 90 day RX:90 tablet    RX Name and Strength:DULoxetine (CYMBALTA) 60 MG capsule   How is the patient currently taking it? (ex. 1XDay):1 xday  Is this a 30 day or 90 day RX:90 capsule    RX Name and Strength:insulin degludec (TRESIBA FLEXTOUCH U-200) 200 unit/mL (3 mL) insulin pen   How is the patient currently taking it? (ex. 1XDay):1 xday  Is this a 30 day or 90 day RX:27 pen    RX Name and Strength:dulaglutide (TRULICITY) 0.75 mg/0.5 mL pen injector   How is the patient currently taking it? (ex. 1XDay):1 xweekly  Is this a 30 day or 90 day RX:12 pen    RX Name and Strength:fluticasone propionate (FLONASE) 50 mcg/actuation nasal spray  How is the patient currently taking it? (ex. 1XDay):as needed  Is this a 30 day or 90 day RX:30    RX Name and Strength:BD DARÍO 2ND GEN PEN NEEDLE 32 gauge x 5/32" Ndle   How is the patient currently taking it? (ex. 1XDay):daily  Is this a 30 day or 90 day RX:100 each    Local or Mail Order:local  List of preferred pharmacies on file (remove unneeded): [unfilled]  If different Pharmacy is " requested, enter Pharmacy information here including location and phone number: [facility]/pharmacy #[id number] - [address]   Phone: [phone]  Fax: [phone]         Ordering Provider:scotty      Preferred Method of Contact: Phone Call  Patient's Preferred Phone Number on File: [phone]   Best Call Back Number, if different:  Additional Information: refill request, pt called and stated pharmacy needs approval to refill, please advise. thanks

## 2025-03-10 ENCOUNTER — INFUSION (OUTPATIENT)
Dept: INFUSION THERAPY | Facility: HOSPITAL | Age: 65
End: 2025-03-10
Attending: FAMILY MEDICINE
Payer: MEDICARE

## 2025-03-10 VITALS
OXYGEN SATURATION: 95 % | WEIGHT: 150 LBS | SYSTOLIC BLOOD PRESSURE: 93 MMHG | BODY MASS INDEX: 25.75 KG/M2 | TEMPERATURE: 97 F | DIASTOLIC BLOOD PRESSURE: 61 MMHG | HEART RATE: 86 BPM

## 2025-03-10 DIAGNOSIS — M85.89 OSTEOPENIA OF MULTIPLE SITES: Primary | ICD-10-CM

## 2025-03-10 PROCEDURE — 63600175 PHARM REV CODE 636 W HCPCS: Mod: JZ,TB | Performed by: NURSE PRACTITIONER

## 2025-03-10 PROCEDURE — 96372 THER/PROPH/DIAG INJ SC/IM: CPT

## 2025-03-10 RX ADMIN — DENOSUMAB 60 MG: 60 INJECTION SUBCUTANEOUS at 01:03

## 2025-03-18 PROBLEM — Z00.00 MEDICARE ANNUAL WELLNESS VISIT, SUBSEQUENT: Status: ACTIVE | Noted: 2025-03-18

## 2025-03-20 DIAGNOSIS — Z79.4 TYPE 2 DIABETES MELLITUS WITH DIABETIC POLYNEUROPATHY, WITH LONG-TERM CURRENT USE OF INSULIN: ICD-10-CM

## 2025-03-20 DIAGNOSIS — E11.42 TYPE 2 DIABETES MELLITUS WITH DIABETIC POLYNEUROPATHY, WITH LONG-TERM CURRENT USE OF INSULIN: ICD-10-CM

## 2025-03-20 RX ORDER — INSULIN DEGLUDEC 200 U/ML
INJECTION, SOLUTION SUBCUTANEOUS
Qty: 3 ML | Refills: 3 | Status: SHIPPED | OUTPATIENT
Start: 2025-03-20

## 2025-03-21 ENCOUNTER — TELEPHONE (OUTPATIENT)
Dept: PRIMARY CARE CLINIC | Facility: CLINIC | Age: 65
End: 2025-03-21
Payer: MEDICARE

## 2025-03-21 NOTE — TELEPHONE ENCOUNTER
----- Message from Aubrie sent at 3/21/2025 10:14 AM CDT -----  .Who Called: Dede HERNANDEZ MistricPatient's Preferred Phone Number on File: [phone] Best Call Back Number, if different:Additional Information: pt call to r/s her appt she has possible right hip fracture and wanted to let

## 2025-05-02 DIAGNOSIS — J45.20 MILD INTERMITTENT ASTHMA WITHOUT COMPLICATION: ICD-10-CM

## 2025-05-02 RX ORDER — FLUTICASONE PROPIONATE 50 MCG
1 SPRAY, SUSPENSION (ML) NASAL 2 TIMES DAILY PRN
Qty: 48 ML | Refills: 1 | Status: SHIPPED | OUTPATIENT
Start: 2025-05-02

## 2025-05-03 NOTE — PROGRESS NOTES
"Follow-up (6 month follow up) and URI (X 3 weeks productive cough, nasal drip and chest congestion)       HPI:    Pt presents for 6 mo follow up.  She has been feeling okay.  She had an episode of right hip bursitis in March. Saw Dr Bateman at [facility]. She had an injection. She is doing PT. She can't lie on right side on a hard surface.  She has been sleeping okay.  Her appetite has been normal; she does not eat much.   She takes Tresiba 50 units in am. Her sugars drop to the 30's and 40's at night. She decreased her Tresiba to 40 units.  A1c 6.2 on 1/09/2025.   Patient had eyes examined on 11/07/2024.     She feels dizzy at times. She does not drink enough water; she was drinking 2 bottles; now she is drinking 4 bottles.      Current Outpatient Medications   Medication Instructions    atorvastatin (LIPITOR) 80 mg, Oral, Daily    BD DARÍO 2ND GEN PEN NEEDLE 32 gauge x 5/32" Ndle 1 pen , subcutaneous (via wearable injector), 3 times daily PRN, use as directed    cetirizine (ZYRTEC) 10 mg, Daily    cyclobenzaprine (FLEXERIL) 5 MG tablet Take 1 tablet by mouth once to twice a day for back pain.    DULoxetine (CYMBALTA) 60 mg, Oral, Daily    ergocalciferol (VITAMIN D2) 50,000 Units, Every 7 days    fluticasone propionate (FLONASE) 50 mcg, Each Nostril, 2 times daily PRN    gabapentin (NEURONTIN) 300 mg, Oral, 2 times daily    insulin degludec (TRESIBA FLEXTOUCH U-200) 200 unit/mL (3 mL) insulin pen No dose, route, or frequency recorded.    magnesium oxide (MAG-OX) 400 mg, Daily    ondansetron (ZOFRAN-ODT) 4 mg, Every 6 hours PRN    ONETOUCH VERIO TEST STRIPS Strp 2 times daily    pantoprazole (PROTONIX) 40 mg, Oral, Daily    polycarbophil (FIBERCON) 625 mg tablet 2 tablets, 2 times daily    SYNJARDY XR 12.5-1,000 mg TBph 1 tablet, Oral, 2 times daily    topiramate (TOPAMAX) 50 mg, Oral, Daily, At bedtime    traMADoL (ULTRAM) 50 mg, Oral, Every 6 hours PRN    TRULICITY 0.75 mg, Subcutaneous, Every 7 days " "        ROS:    Patient reports a 3 week history of productive cough, sinus drainage and chest congestion.      PE:    ..Visit Vitals  BP 92/61   Pulse 108   Temp 99.1 °F (37.3 °C)   Ht 5' 4" (1.626 m)   Wt 67.1 kg (148 lb)   SpO2 (!) 94%   BMI 25.40 kg/m²        General:  She is a well-developed well-nourished white female in no apparent distress she is alert and oriented.    Ears:  Canals are clear, TMs clear with normal light reflex  Nares:  Mildly edematous mucosa/turbinates with clear discharge  Oropharynx:  Mild postnasal drainage  Neck: Supple without adenopathy.    Chest:  Clear to auscultation bilaterally.  CV: Regular rate rhythm without murmurs rubs gallops        1. Type 2 diabetes mellitus with diabetic polyneuropathy, with long-term current use of insulin  Overview:  A1c, microalbumin and lipid profile pending.    Foot exam performed today; neuropathy noted.    Last eye exam 2022.  Continue ADA diet; limit intake of sugary foods and refined carbohydrates.  Continue Trulicity, SynJardy and Tresiba.    09/21/2023:  Labs 03/2023: A1c 6.4.  Microalbumin 30 milligrams/liter.  Foot exam performed today.  Continue ADA diet; limit intake of sugary foods and refined carbohydrates.    02/16/2024:  Refills for Trulicity sent.  Labs 09/2023:  A1c 6.2.  03/2023: Microalbumin 30 milligrams/liter.  Patient advised to have eyes examined.    03/22/2024:  A1c and microalbumin level pending.  Foot exam performed today.    Continue ADA diet; limit intake of sugary foods and refined carbohydrates.  A1c 6.7.  Microalbumin to creatinine ratio 8.2    09/23/2024: A1c pending.    Continue ADA diet; limit intake of sugary foods and refined carbohydrates.  Foot exam performed today.    Retinal images obtained in office today.  Patient was off Trulicity for 3 months; she has been back on it for 3 weeks.      Assessment & Plan:  A1c 01/09/2025: 6.2.  Check microalbumin and A1c in 3 months.  Patient has been experiencing some lows " in the evening; she skips a lot of meals and does not eat much.  She was on 15 units of insulin daily; she has decrease this to 40 units.  Patient advised to decrease basal insulin to 30 units or increase her food intake to avoid lows.    Orders:  -     Hemoglobin A1C; Future; Expected date: 08/08/2025  -     Microalbumin/Creatinine Ratio, Urine; Future; Expected date: 08/08/2025    2. Chronic kidney disease, stage 3a  Overview:  Labs 3/08/2024: BUN 31.9/creatinine 1.34.   Drink plenty of water.    Avoid NSAIDs.      09/17/2024:  Creatinine 07/02/2024:  1.66, GFR 34.  Patient encouraged to drink plenty of water.    Avoid NSAIDs.  Patient has repeat CMP with oncology in 3 months.      Assessment & Plan:  Creatinine 01/09/2020 5:1.21; GFR 50.  Patient will repeat CMP in a few months.  Patient encouraged to increase water intake.    Avoid NSAIDs.    Orders:  -     Magnesium; Future; Expected date: 08/08/2025  -     Phosphorus; Future; Expected date: 08/08/2025  -     PTH, Intact; Future; Expected date: 08/08/2025    3. Anxiety  Overview:  Patient states her anxiety has been doing well; continue duloxetine.  Refills sent.  She denies any panic attacks, sadness or depression.    03/22/2024:  Patient states her anxiety has been doing well; continue duloxetine.  Refills sent.  She denies any panic attacks, sadness or depression.    09/23/2024:  Patient states her anxiety has been doing well; continue duloxetine.  Refills sent.  She denies any panic attacks, sadness or depression.    Assessment & Plan:  Patient states her anxiety has been doing well; continue duloxetine.  Refills sent.  She denies any panic attacks, sadness or depression.      4. Chronic left-sided low back pain without sciatica  Overview:  Patient states her back pain is doing better; it is still present but improved.  Continue light activity.    Heat/massage.    Call if symptoms persist or worsen.    11/13/2024: Patient presents with left lower back pain  for months.    Assessment & Plan:  Stable; followed by Dr. Hughes.  Patient saw Dr. Huerta in [place]; he referred her back here.      5. Hypercholesterolemia  Overview:  Patient has been compliant with medications; refills sent.    Lipid profile pending.    Continue low-cholesterol/low-fat diet.    09/21/2023:  Lipid profile 03/2023:  Total cholesterol 121, HDL 46, triglycerides 170 and LDL 47.    03/22/2024:  Patient has been compliant with medications; refills sent.    Continue low-cholesterol/low-fat diet.    Lipid profile pending.  Total cholesterol 154, HDL 57, triglycerides 154 and LDL 66.        Orders:  -     Lipid Panel; Future; Expected date: 08/08/2025    6. Postnasal drip  Assessment & Plan:  Patient reports rhinorrhea, postnasal drip with slight cough.    Patient has been taking Chlor-Trimeton; patient encouraged to continue.  Patient had a lot of sneezing initially; this has essentially resolved.  Her cough is mild.      7. Vitamin D deficiency  Overview:  Patient is on 45619 units weekly; vitamin-D level pending.    03/22/2024: Patient continues to take 49165 units weekly; vitamin-D level pending.  Vitamin-D level 78.3    Assessment & Plan:  Patient is on 14717 units every 7 days.    Vitamin-D level pending.    Orders:  -     Vitamin D; Future; Expected date: 08/08/2025    8. Right hip pain  Overview:  05/08/2025: Patient has had right hip issues since March.    Patient was diagnosed with trochanteric bursitis and gluteal tendinosis.  Patient is doing physical therapy.    Followed by Dr. Srinivasan.                ..Follow up in about 6 months (around 11/8/2025) for Wellness, With labwork prior to visit.       Future Appointments   Date Time Provider Department Center   9/16/2025  3:00 PM LAB, [facility] OLB LAB [facility]   9/23/2025  1:00 PM [facility]   9/23/2025  1:30 PM INJECTION CHAIR 01, [facility] CHEMOTHERAPY INFUSION [facility] CHEMO [facility]   11/12/2025  1:00  PM Marin Mccoy MD [facility]

## 2025-05-08 ENCOUNTER — OFFICE VISIT (OUTPATIENT)
Dept: PRIMARY CARE CLINIC | Facility: CLINIC | Age: 65
End: 2025-05-08
Payer: MEDICARE

## 2025-05-08 VITALS
DIASTOLIC BLOOD PRESSURE: 61 MMHG | OXYGEN SATURATION: 94 % | WEIGHT: 148 LBS | TEMPERATURE: 99 F | SYSTOLIC BLOOD PRESSURE: 92 MMHG | HEART RATE: 108 BPM | BODY MASS INDEX: 25.27 KG/M2 | HEIGHT: 64 IN

## 2025-05-08 DIAGNOSIS — Z79.4 TYPE 2 DIABETES MELLITUS WITH DIABETIC POLYNEUROPATHY, WITH LONG-TERM CURRENT USE OF INSULIN: ICD-10-CM

## 2025-05-08 DIAGNOSIS — Z79.4 TYPE 2 DIABETES MELLITUS WITH DIABETIC POLYNEUROPATHY, WITH LONG-TERM CURRENT USE OF INSULIN: Primary | ICD-10-CM

## 2025-05-08 DIAGNOSIS — R09.82 POSTNASAL DRIP: ICD-10-CM

## 2025-05-08 DIAGNOSIS — M54.50 CHRONIC LEFT-SIDED LOW BACK PAIN WITHOUT SCIATICA: ICD-10-CM

## 2025-05-08 DIAGNOSIS — N18.31 CHRONIC KIDNEY DISEASE, STAGE 3A: ICD-10-CM

## 2025-05-08 DIAGNOSIS — G89.29 CHRONIC LEFT-SIDED LOW BACK PAIN WITHOUT SCIATICA: ICD-10-CM

## 2025-05-08 DIAGNOSIS — E11.42 TYPE 2 DIABETES MELLITUS WITH DIABETIC POLYNEUROPATHY, WITH LONG-TERM CURRENT USE OF INSULIN: Primary | ICD-10-CM

## 2025-05-08 DIAGNOSIS — E11.42 TYPE 2 DIABETES MELLITUS WITH DIABETIC POLYNEUROPATHY, WITH LONG-TERM CURRENT USE OF INSULIN: ICD-10-CM

## 2025-05-08 DIAGNOSIS — E55.9 VITAMIN D DEFICIENCY: ICD-10-CM

## 2025-05-08 DIAGNOSIS — E78.00 HYPERCHOLESTEROLEMIA: ICD-10-CM

## 2025-05-08 DIAGNOSIS — M25.551 RIGHT HIP PAIN: ICD-10-CM

## 2025-05-08 DIAGNOSIS — F41.9 ANXIETY: ICD-10-CM

## 2025-05-08 RX ORDER — PEN NEEDLE, DIABETIC 32GX 5/32"
1 NEEDLE, DISPOSABLE MISCELLANEOUS 3 TIMES DAILY PRN
Qty: 100 EACH | Refills: 2 | Status: SHIPPED | OUTPATIENT
Start: 2025-05-08

## 2025-05-08 NOTE — ASSESSMENT & PLAN NOTE
Patient reports rhinorrhea, postnasal drip with slight cough.    Patient has been taking Chlor-Trimeton; patient encouraged to continue.  Patient had a lot of sneezing initially; this has essentially resolved.  Her cough is mild.

## 2025-05-08 NOTE — ASSESSMENT & PLAN NOTE
A1c 01/09/2025: 6.2.  Check microalbumin and A1c in 3 months.  Patient has been experiencing some lows in the evening; she skips a lot of meals and does not eat much.  She was on 15 units of insulin daily; she has decrease this to 40 units.  Patient advised to decrease basal insulin to 30 units or increase her food intake to avoid lows.

## 2025-05-08 NOTE — ASSESSMENT & PLAN NOTE
Patient states her anxiety has been doing well; continue duloxetine.  Refills sent.  She denies any panic attacks, sadness or depression.

## 2025-05-08 NOTE — ASSESSMENT & PLAN NOTE
Creatinine 01/09/2020 5:1.21; GFR 50.  Patient will repeat CMP in a few months.  Patient encouraged to increase water intake.    Avoid NSAIDs.

## 2025-06-25 ENCOUNTER — PATIENT OUTREACH (OUTPATIENT)
Facility: CLINIC | Age: 65
End: 2025-06-25
Payer: MEDICARE

## 2025-06-25 NOTE — PROGRESS NOTES
Population Health Outreach.    DM Urine and lipid panel overdue- Ordered and expected to collect approx. 8/8/25

## 2025-07-10 ENCOUNTER — HOSPITAL ENCOUNTER (OUTPATIENT)
Dept: RADIOLOGY | Facility: HOSPITAL | Age: 65
Discharge: HOME | End: 2025-07-10
Attending: NURSE PRACTITIONER
Payer: MEDICARE

## 2025-07-10 DIAGNOSIS — Z12.31 SCREENING MAMMOGRAM FOR BREAST CANCER: ICD-10-CM

## 2025-07-10 PROCEDURE — 77067 SCR MAMMO BI INCL CAD: CPT | Mod: TC
